# Patient Record
Sex: FEMALE | Race: WHITE | NOT HISPANIC OR LATINO | Employment: OTHER | URBAN - METROPOLITAN AREA
[De-identification: names, ages, dates, MRNs, and addresses within clinical notes are randomized per-mention and may not be internally consistent; named-entity substitution may affect disease eponyms.]

---

## 2019-12-09 ENCOUNTER — RECORDS - HEALTHEAST (OUTPATIENT)
Dept: LAB | Facility: CLINIC | Age: 79
End: 2019-12-09

## 2019-12-09 LAB
ALBUMIN SERPL-MCNC: 3.4 G/DL (ref 3.5–5)
ALP SERPL-CCNC: 81 U/L (ref 45–120)
ALT SERPL W P-5'-P-CCNC: 21 U/L (ref 0–45)
ANION GAP SERPL CALCULATED.3IONS-SCNC: 8 MMOL/L (ref 5–18)
AST SERPL W P-5'-P-CCNC: 24 U/L (ref 0–40)
BILIRUB SERPL-MCNC: 0.4 MG/DL (ref 0–1)
BUN SERPL-MCNC: 21 MG/DL (ref 8–28)
CALCIUM SERPL-MCNC: 9.5 MG/DL (ref 8.5–10.5)
CHLORIDE BLD-SCNC: 106 MMOL/L (ref 98–107)
CO2 SERPL-SCNC: 28 MMOL/L (ref 22–31)
CREAT SERPL-MCNC: 0.74 MG/DL (ref 0.6–1.1)
ERYTHROCYTE [DISTWIDTH] IN BLOOD BY AUTOMATED COUNT: 14.3 % (ref 11–14.5)
GFR SERPL CREATININE-BSD FRML MDRD: >60 ML/MIN/1.73M2
GLUCOSE BLD-MCNC: 78 MG/DL (ref 70–125)
HCT VFR BLD AUTO: 39.6 % (ref 35–47)
HGB BLD-MCNC: 12.9 G/DL (ref 12–16)
MCH RBC QN AUTO: 29.5 PG (ref 27–34)
MCHC RBC AUTO-ENTMCNC: 32.6 G/DL (ref 32–36)
MCV RBC AUTO: 91 FL (ref 80–100)
PLATELET # BLD AUTO: 352 THOU/UL (ref 140–440)
PMV BLD AUTO: 11.7 FL (ref 8.5–12.5)
POTASSIUM BLD-SCNC: 4.1 MMOL/L (ref 3.5–5)
PROT SERPL-MCNC: 7.1 G/DL (ref 6–8)
RBC # BLD AUTO: 4.37 MILL/UL (ref 3.8–5.4)
SODIUM SERPL-SCNC: 142 MMOL/L (ref 136–145)
TSH SERPL DL<=0.005 MIU/L-ACNC: 1.56 UIU/ML (ref 0.3–5)
VIT B12 SERPL-MCNC: 459 PG/ML (ref 213–816)
WBC: 8.8 THOU/UL (ref 4–11)

## 2020-01-08 ENCOUNTER — RECORDS - HEALTHEAST (OUTPATIENT)
Dept: LAB | Facility: CLINIC | Age: 80
End: 2020-01-08

## 2020-01-09 LAB — 25(OH)D3 SERPL-MCNC: 41.2 NG/ML (ref 30–80)

## 2020-08-19 ENCOUNTER — RECORDS - HEALTHEAST (OUTPATIENT)
Dept: LAB | Facility: CLINIC | Age: 80
End: 2020-08-19

## 2020-08-20 LAB
ALBUMIN SERPL-MCNC: 3.5 G/DL (ref 3.5–5)
ALP SERPL-CCNC: 80 U/L (ref 45–120)
ALT SERPL W P-5'-P-CCNC: 14 U/L (ref 0–45)
ANION GAP SERPL CALCULATED.3IONS-SCNC: 11 MMOL/L (ref 5–18)
AST SERPL W P-5'-P-CCNC: 18 U/L (ref 0–40)
BILIRUB SERPL-MCNC: 0.5 MG/DL (ref 0–1)
BUN SERPL-MCNC: 18 MG/DL (ref 8–28)
CALCIUM SERPL-MCNC: 9.9 MG/DL (ref 8.5–10.5)
CHLORIDE BLD-SCNC: 107 MMOL/L (ref 98–107)
CO2 SERPL-SCNC: 24 MMOL/L (ref 22–31)
CREAT SERPL-MCNC: 0.78 MG/DL (ref 0.6–1.1)
ERYTHROCYTE [DISTWIDTH] IN BLOOD BY AUTOMATED COUNT: 14.4 % (ref 11–14.5)
GFR SERPL CREATININE-BSD FRML MDRD: >60 ML/MIN/1.73M2
GLUCOSE BLD-MCNC: 91 MG/DL (ref 70–125)
HCT VFR BLD AUTO: 39.9 % (ref 35–47)
HGB BLD-MCNC: 12.8 G/DL (ref 12–16)
MCH RBC QN AUTO: 29 PG (ref 27–34)
MCHC RBC AUTO-ENTMCNC: 32.1 G/DL (ref 32–36)
MCV RBC AUTO: 90 FL (ref 80–100)
PLATELET # BLD AUTO: 338 THOU/UL (ref 140–440)
PMV BLD AUTO: 11.3 FL (ref 8.5–12.5)
POTASSIUM BLD-SCNC: 3.8 MMOL/L (ref 3.5–5)
PROT SERPL-MCNC: 6.9 G/DL (ref 6–8)
RBC # BLD AUTO: 4.42 MILL/UL (ref 3.8–5.4)
SODIUM SERPL-SCNC: 142 MMOL/L (ref 136–145)
WBC: 8.1 THOU/UL (ref 4–11)

## 2021-04-07 ENCOUNTER — RECORDS - HEALTHEAST (OUTPATIENT)
Dept: LAB | Facility: CLINIC | Age: 81
End: 2021-04-07

## 2021-04-08 LAB
ANION GAP SERPL CALCULATED.3IONS-SCNC: 12 MMOL/L (ref 5–18)
BUN SERPL-MCNC: 22 MG/DL (ref 8–28)
CALCIUM SERPL-MCNC: 9.6 MG/DL (ref 8.5–10.5)
CHLORIDE BLD-SCNC: 106 MMOL/L (ref 98–107)
CO2 SERPL-SCNC: 24 MMOL/L (ref 22–31)
CREAT SERPL-MCNC: 0.83 MG/DL (ref 0.6–1.1)
ERYTHROCYTE [DISTWIDTH] IN BLOOD BY AUTOMATED COUNT: 14.3 % (ref 11–14.5)
GFR SERPL CREATININE-BSD FRML MDRD: >60 ML/MIN/1.73M2
GLUCOSE BLD-MCNC: 116 MG/DL (ref 70–125)
HCT VFR BLD AUTO: 40.3 % (ref 35–47)
HGB BLD-MCNC: 13 G/DL (ref 12–16)
MCH RBC QN AUTO: 30.2 PG (ref 27–34)
MCHC RBC AUTO-ENTMCNC: 32.3 G/DL (ref 32–36)
MCV RBC AUTO: 94 FL (ref 80–100)
PLATELET # BLD AUTO: 378 THOU/UL (ref 140–440)
PMV BLD AUTO: 11.1 FL (ref 8.5–12.5)
POTASSIUM BLD-SCNC: 4.2 MMOL/L (ref 3.5–5)
RBC # BLD AUTO: 4.31 MILL/UL (ref 3.8–5.4)
SODIUM SERPL-SCNC: 142 MMOL/L (ref 136–145)
WBC: 10.9 THOU/UL (ref 4–11)

## 2021-04-09 LAB — 25(OH)D3 SERPL-MCNC: 39.1 NG/ML (ref 30–80)

## 2022-01-01 ENCOUNTER — LAB REQUISITION (OUTPATIENT)
Dept: LAB | Facility: CLINIC | Age: 82
End: 2022-01-01
Payer: COMMERCIAL

## 2022-01-01 DIAGNOSIS — R05.9 COUGH, UNSPECIFIED: ICD-10-CM

## 2022-01-01 LAB
FLUAV AG SPEC QL IA: POSITIVE
FLUBV AG SPEC QL IA: NEGATIVE

## 2022-01-01 PROCEDURE — 87804 INFLUENZA ASSAY W/OPTIC: CPT | Mod: ORL

## 2022-03-02 ENCOUNTER — LAB REQUISITION (OUTPATIENT)
Dept: LAB | Facility: CLINIC | Age: 82
End: 2022-03-02
Payer: COMMERCIAL

## 2022-03-02 DIAGNOSIS — I10 ESSENTIAL (PRIMARY) HYPERTENSION: ICD-10-CM

## 2022-03-02 DIAGNOSIS — M81.0 AGE-RELATED OSTEOPOROSIS WITHOUT CURRENT PATHOLOGICAL FRACTURE: ICD-10-CM

## 2022-03-03 LAB
ALBUMIN SERPL-MCNC: 3.6 G/DL (ref 3.5–5)
ALP SERPL-CCNC: 77 U/L (ref 45–120)
ALT SERPL W P-5'-P-CCNC: 15 U/L (ref 0–45)
ANION GAP SERPL CALCULATED.3IONS-SCNC: 8 MMOL/L (ref 5–18)
AST SERPL W P-5'-P-CCNC: 22 U/L (ref 0–40)
BILIRUB SERPL-MCNC: 0.5 MG/DL (ref 0–1)
BUN SERPL-MCNC: 17 MG/DL (ref 8–28)
CALCIUM SERPL-MCNC: 9.6 MG/DL (ref 8.5–10.5)
CHLORIDE BLD-SCNC: 103 MMOL/L (ref 98–107)
CO2 SERPL-SCNC: 28 MMOL/L (ref 22–31)
CREAT SERPL-MCNC: 0.79 MG/DL (ref 0.6–1.1)
ERYTHROCYTE [DISTWIDTH] IN BLOOD BY AUTOMATED COUNT: 14.4 % (ref 10–15)
GFR SERPL CREATININE-BSD FRML MDRD: 74 ML/MIN/1.73M2
GLUCOSE BLD-MCNC: 72 MG/DL (ref 70–125)
HCT VFR BLD AUTO: 39 % (ref 35–47)
HGB BLD-MCNC: 12.7 G/DL (ref 11.7–15.7)
MCH RBC QN AUTO: 30.7 PG (ref 26.5–33)
MCHC RBC AUTO-ENTMCNC: 32.6 G/DL (ref 31.5–36.5)
MCV RBC AUTO: 94 FL (ref 78–100)
PLATELET # BLD AUTO: 343 10E3/UL (ref 150–450)
POTASSIUM BLD-SCNC: 4.5 MMOL/L (ref 3.5–5)
PROT SERPL-MCNC: 6.6 G/DL (ref 6–8)
RBC # BLD AUTO: 4.14 10E6/UL (ref 3.8–5.2)
SODIUM SERPL-SCNC: 139 MMOL/L (ref 136–145)
WBC # BLD AUTO: 6.8 10E3/UL (ref 4–11)

## 2022-03-03 PROCEDURE — 82306 VITAMIN D 25 HYDROXY: CPT | Mod: ORL | Performed by: FAMILY MEDICINE

## 2022-03-03 PROCEDURE — 80053 COMPREHEN METABOLIC PANEL: CPT | Mod: ORL | Performed by: FAMILY MEDICINE

## 2022-03-03 PROCEDURE — P9603 ONE-WAY ALLOW PRORATED MILES: HCPCS | Mod: ORL | Performed by: FAMILY MEDICINE

## 2022-03-03 PROCEDURE — 85027 COMPLETE CBC AUTOMATED: CPT | Mod: ORL | Performed by: FAMILY MEDICINE

## 2022-03-03 PROCEDURE — 36415 COLL VENOUS BLD VENIPUNCTURE: CPT | Mod: ORL | Performed by: FAMILY MEDICINE

## 2022-03-04 LAB — DEPRECATED CALCIDIOL+CALCIFEROL SERPL-MC: 33 UG/L (ref 30–80)

## 2022-08-11 ENCOUNTER — LAB REQUISITION (OUTPATIENT)
Dept: LAB | Facility: CLINIC | Age: 82
End: 2022-08-11
Payer: COMMERCIAL

## 2022-08-11 DIAGNOSIS — G12.20 MOTOR NEURON DISEASE, UNSPECIFIED (H): ICD-10-CM

## 2022-08-16 LAB
ALBUMIN SERPL BCG-MCNC: 3.9 G/DL (ref 3.5–5.2)
ALP SERPL-CCNC: 66 U/L (ref 35–104)
ALT SERPL W P-5'-P-CCNC: 18 U/L (ref 10–35)
ANION GAP SERPL CALCULATED.3IONS-SCNC: 20 MMOL/L (ref 7–15)
AST SERPL W P-5'-P-CCNC: 47 U/L (ref 10–35)
BILIRUB SERPL-MCNC: 0.5 MG/DL
BUN SERPL-MCNC: 19.9 MG/DL (ref 8–23)
CALCIUM SERPL-MCNC: 10.1 MG/DL (ref 8.8–10.2)
CHLORIDE SERPL-SCNC: 105 MMOL/L (ref 98–107)
CREAT SERPL-MCNC: 0.81 MG/DL (ref 0.51–0.95)
DEPRECATED HCO3 PLAS-SCNC: 19 MMOL/L (ref 22–29)
ERYTHROCYTE [DISTWIDTH] IN BLOOD BY AUTOMATED COUNT: 15 % (ref 10–15)
GFR SERPL CREATININE-BSD FRML MDRD: 72 ML/MIN/1.73M2
GLUCOSE SERPL-MCNC: 65 MG/DL (ref 70–99)
HCT VFR BLD AUTO: 39.7 % (ref 35–47)
HGB BLD-MCNC: 12.9 G/DL (ref 11.7–15.7)
MCH RBC QN AUTO: 30.8 PG (ref 26.5–33)
MCHC RBC AUTO-ENTMCNC: 32.5 G/DL (ref 31.5–36.5)
MCV RBC AUTO: 95 FL (ref 78–100)
PLATELET # BLD AUTO: 350 10E3/UL (ref 150–450)
POTASSIUM SERPL-SCNC: 5 MMOL/L (ref 3.4–5.3)
PROT SERPL-MCNC: 6.9 G/DL (ref 6.4–8.3)
RBC # BLD AUTO: 4.19 10E6/UL (ref 3.8–5.2)
SODIUM SERPL-SCNC: 144 MMOL/L (ref 136–145)
WBC # BLD AUTO: 6.3 10E3/UL (ref 4–11)

## 2022-08-16 PROCEDURE — 80053 COMPREHEN METABOLIC PANEL: CPT | Mod: ORL | Performed by: NURSE PRACTITIONER

## 2022-08-16 PROCEDURE — 85027 COMPLETE CBC AUTOMATED: CPT | Mod: ORL | Performed by: NURSE PRACTITIONER

## 2022-08-16 PROCEDURE — 36415 COLL VENOUS BLD VENIPUNCTURE: CPT | Mod: ORL | Performed by: NURSE PRACTITIONER

## 2022-08-16 PROCEDURE — P9604 ONE-WAY ALLOW PRORATED TRIP: HCPCS | Mod: ORL | Performed by: NURSE PRACTITIONER

## 2022-10-20 ENCOUNTER — LAB REQUISITION (OUTPATIENT)
Dept: LAB | Facility: CLINIC | Age: 82
End: 2022-10-20
Payer: COMMERCIAL

## 2022-10-20 DIAGNOSIS — R53.83 OTHER FATIGUE: ICD-10-CM

## 2022-10-21 ENCOUNTER — LAB REQUISITION (OUTPATIENT)
Dept: LAB | Facility: CLINIC | Age: 82
End: 2022-10-21
Payer: COMMERCIAL

## 2022-10-21 DIAGNOSIS — R53.83 OTHER FATIGUE: ICD-10-CM

## 2022-10-21 LAB
ALBUMIN UR-MCNC: 20 MG/DL
APPEARANCE UR: CLEAR
BILIRUB UR QL STRIP: NEGATIVE
COLOR UR AUTO: YELLOW
GLUCOSE UR STRIP-MCNC: NEGATIVE MG/DL
HGB UR QL STRIP: NEGATIVE
HYALINE CASTS: 1 /LPF
KETONES UR STRIP-MCNC: NEGATIVE MG/DL
LEUKOCYTE ESTERASE UR QL STRIP: NEGATIVE
MUCOUS THREADS #/AREA URNS LPF: PRESENT /LPF
NITRATE UR QL: NEGATIVE
PH UR STRIP: 6 [PH] (ref 5–7)
RBC URINE: 1 /HPF
SP GR UR STRIP: 1.03 (ref 1–1.03)
SQUAMOUS EPITHELIAL: 1 /HPF
TRANSITIONAL EPI: <1 /HPF
UROBILINOGEN UR STRIP-MCNC: NORMAL MG/DL
WBC URINE: 4 /HPF

## 2022-10-21 PROCEDURE — 81001 URINALYSIS AUTO W/SCOPE: CPT | Mod: ORL | Performed by: NURSE PRACTITIONER

## 2022-10-21 PROCEDURE — 87086 URINE CULTURE/COLONY COUNT: CPT | Mod: ORL | Performed by: NURSE PRACTITIONER

## 2022-10-22 LAB — BACTERIA UR CULT: NORMAL

## 2022-10-25 LAB
ANION GAP SERPL CALCULATED.3IONS-SCNC: 11 MMOL/L (ref 7–15)
BUN SERPL-MCNC: 22.7 MG/DL (ref 8–23)
CALCIUM SERPL-MCNC: 9.6 MG/DL (ref 8.8–10.2)
CHLORIDE SERPL-SCNC: 107 MMOL/L (ref 98–107)
CREAT SERPL-MCNC: 0.85 MG/DL (ref 0.51–0.95)
DEPRECATED HCO3 PLAS-SCNC: 25 MMOL/L (ref 22–29)
ERYTHROCYTE [DISTWIDTH] IN BLOOD BY AUTOMATED COUNT: 16.7 % (ref 10–15)
GFR SERPL CREATININE-BSD FRML MDRD: 68 ML/MIN/1.73M2
GLUCOSE SERPL-MCNC: 104 MG/DL (ref 70–99)
HCT VFR BLD AUTO: 37.6 % (ref 35–47)
HGB BLD-MCNC: 12.1 G/DL (ref 11.7–15.7)
MCH RBC QN AUTO: 30.9 PG (ref 26.5–33)
MCHC RBC AUTO-ENTMCNC: 32.2 G/DL (ref 31.5–36.5)
MCV RBC AUTO: 96 FL (ref 78–100)
PLATELET # BLD AUTO: 378 10E3/UL (ref 150–450)
POTASSIUM SERPL-SCNC: 4.2 MMOL/L (ref 3.4–5.3)
RBC # BLD AUTO: 3.91 10E6/UL (ref 3.8–5.2)
SODIUM SERPL-SCNC: 143 MMOL/L (ref 136–145)
WBC # BLD AUTO: 9 10E3/UL (ref 4–11)

## 2022-10-25 PROCEDURE — 85027 COMPLETE CBC AUTOMATED: CPT | Mod: ORL | Performed by: NURSE PRACTITIONER

## 2022-10-25 PROCEDURE — 36415 COLL VENOUS BLD VENIPUNCTURE: CPT | Mod: ORL | Performed by: NURSE PRACTITIONER

## 2022-10-25 PROCEDURE — P9603 ONE-WAY ALLOW PRORATED MILES: HCPCS | Mod: ORL | Performed by: NURSE PRACTITIONER

## 2022-10-25 PROCEDURE — 80048 BASIC METABOLIC PNL TOTAL CA: CPT | Mod: ORL | Performed by: NURSE PRACTITIONER

## 2023-01-01 ENCOUNTER — LAB REQUISITION (OUTPATIENT)
Dept: LAB | Facility: CLINIC | Age: 83
End: 2023-01-01
Payer: COMMERCIAL

## 2023-01-01 ENCOUNTER — APPOINTMENT (OUTPATIENT)
Dept: CT IMAGING | Facility: CLINIC | Age: 83
End: 2023-01-01
Attending: EMERGENCY MEDICINE
Payer: COMMERCIAL

## 2023-01-01 ENCOUNTER — APPOINTMENT (OUTPATIENT)
Dept: CT IMAGING | Facility: CLINIC | Age: 83
End: 2023-01-01
Attending: INTERNAL MEDICINE
Payer: COMMERCIAL

## 2023-01-01 ENCOUNTER — TELEPHONE (OUTPATIENT)
Dept: NEUROSURGERY | Facility: CLINIC | Age: 83
End: 2023-01-01
Payer: COMMERCIAL

## 2023-01-01 ENCOUNTER — HOSPITAL ENCOUNTER (OUTPATIENT)
Facility: CLINIC | Age: 83
Setting detail: OBSERVATION
Discharge: NURSING, REHAB OR LONG-TERM CARE | End: 2023-05-02
Attending: EMERGENCY MEDICINE | Admitting: HOSPITALIST
Payer: COMMERCIAL

## 2023-01-01 ENCOUNTER — PATIENT OUTREACH (OUTPATIENT)
Dept: CARE COORDINATION | Facility: CLINIC | Age: 83
End: 2023-01-01
Payer: COMMERCIAL

## 2023-01-01 ENCOUNTER — APPOINTMENT (OUTPATIENT)
Dept: GENERAL RADIOLOGY | Facility: CLINIC | Age: 83
End: 2023-01-01
Attending: EMERGENCY MEDICINE
Payer: COMMERCIAL

## 2023-01-01 ENCOUNTER — HOSPITAL ENCOUNTER (EMERGENCY)
Facility: CLINIC | Age: 83
Discharge: HOME | End: 2023-04-13
Attending: EMERGENCY MEDICINE | Admitting: EMERGENCY MEDICINE
Payer: COMMERCIAL

## 2023-01-01 VITALS
SYSTOLIC BLOOD PRESSURE: 142 MMHG | WEIGHT: 145 LBS | OXYGEN SATURATION: 95 % | RESPIRATION RATE: 20 BRPM | DIASTOLIC BLOOD PRESSURE: 78 MMHG | TEMPERATURE: 97.1 F | HEART RATE: 78 BPM

## 2023-01-01 VITALS
OXYGEN SATURATION: 92 % | DIASTOLIC BLOOD PRESSURE: 98 MMHG | SYSTOLIC BLOOD PRESSURE: 134 MMHG | HEART RATE: 74 BPM | RESPIRATION RATE: 18 BRPM | TEMPERATURE: 97 F

## 2023-01-01 DIAGNOSIS — E04.1 NONTOXIC SINGLE THYROID NODULE: ICD-10-CM

## 2023-01-01 DIAGNOSIS — M81.0 AGE-RELATED OSTEOPOROSIS WITHOUT CURRENT PATHOLOGICAL FRACTURE: ICD-10-CM

## 2023-01-01 DIAGNOSIS — R40.4 SPELL OF ALTERED CONSCIOUSNESS: ICD-10-CM

## 2023-01-01 DIAGNOSIS — I60.9 NONTRAUMATIC SUBARACHNOID HEMORRHAGE, UNSPECIFIED (H): ICD-10-CM
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
## 2023-01-01 DIAGNOSIS — S09.90XA INJURY OF HEAD, INITIAL ENCOUNTER: ICD-10-CM

## 2023-01-01 DIAGNOSIS — I60.9 SUBARACHNOID HEMORRHAGE (H): ICD-10-CM

## 2023-01-01 DIAGNOSIS — E55.9 VITAMIN D DEFICIENCY, UNSPECIFIED: ICD-10-CM

## 2023-01-01 DIAGNOSIS — G31.09 OTHER FRONTOTEMPORAL NEUROCOGNITIVE DISORDER (CODE) (H): ICD-10-CM

## 2023-01-01 LAB
ALBUMIN SERPL BCG-MCNC: 3.7 G/DL (ref 3.5–5.2)
ALBUMIN SERPL BCG-MCNC: 4 G/DL (ref 3.5–5.2)
ALBUMIN UR-MCNC: NEGATIVE MG/DL
ALP SERPL-CCNC: 66 U/L (ref 35–104)
ALP SERPL-CCNC: 73 U/L (ref 35–104)
ALT SERPL W P-5'-P-CCNC: 16 U/L (ref 10–35)
ALT SERPL W P-5'-P-CCNC: 17 U/L (ref 10–35)
ANION GAP SERPL CALCULATED.3IONS-SCNC: 11 MMOL/L (ref 7–15)
ANION GAP SERPL CALCULATED.3IONS-SCNC: 11 MMOL/L (ref 7–15)
APPEARANCE UR: CLEAR
AST SERPL W P-5'-P-CCNC: 18 U/L (ref 10–35)
AST SERPL W P-5'-P-CCNC: 24 U/L (ref 10–35)
ATRIAL RATE - MUSE: 77 BPM
BACTERIA UR CULT: NORMAL
BASOPHILS # BLD AUTO: 0.1 10E3/UL (ref 0–0.2)
BASOPHILS NFR BLD AUTO: 1 %
BILIRUB SERPL-MCNC: 0.4 MG/DL
BILIRUB SERPL-MCNC: 0.6 MG/DL
BILIRUB UR QL STRIP: NEGATIVE
BUN SERPL-MCNC: 16.9 MG/DL (ref 8–23)
BUN SERPL-MCNC: 23.2 MG/DL (ref 8–23)
CALCIUM SERPL-MCNC: 9.5 MG/DL (ref 8.8–10.2)
CALCIUM SERPL-MCNC: 9.7 MG/DL (ref 8.8–10.2)
CHLORIDE SERPL-SCNC: 105 MMOL/L (ref 98–107)
CHLORIDE SERPL-SCNC: 105 MMOL/L (ref 98–107)
COLOR UR AUTO: ABNORMAL
CREAT SERPL-MCNC: 0.9 MG/DL (ref 0.51–0.95)
CREAT SERPL-MCNC: 0.92 MG/DL (ref 0.51–0.95)
DEPRECATED CALCIDIOL+CALCIFEROL SERPL-MC: 45 UG/L (ref 20–75)
DEPRECATED HCO3 PLAS-SCNC: 24 MMOL/L (ref 22–29)
DEPRECATED HCO3 PLAS-SCNC: 25 MMOL/L (ref 22–29)
DIASTOLIC BLOOD PRESSURE - MUSE: NORMAL MMHG
EOSINOPHIL # BLD AUTO: 0.2 10E3/UL (ref 0–0.7)
EOSINOPHIL # BLD AUTO: 0.4 10E3/UL (ref 0–0.7)
EOSINOPHIL # BLD AUTO: 0.5 10E3/UL (ref 0–0.7)
EOSINOPHIL NFR BLD AUTO: 3 %
EOSINOPHIL NFR BLD AUTO: 4 %
EOSINOPHIL NFR BLD AUTO: 6 %
ERYTHROCYTE [DISTWIDTH] IN BLOOD BY AUTOMATED COUNT: 14.2 % (ref 10–15)
ERYTHROCYTE [DISTWIDTH] IN BLOOD BY AUTOMATED COUNT: 14.4 % (ref 10–15)
ERYTHROCYTE [DISTWIDTH] IN BLOOD BY AUTOMATED COUNT: 14.4 % (ref 10–15)
GFR SERPL CREATININE-BSD FRML MDRD: 61 ML/MIN/1.73M2
GFR SERPL CREATININE-BSD FRML MDRD: 63 ML/MIN/1.73M2
GLUCOSE SERPL-MCNC: 104 MG/DL (ref 70–99)
GLUCOSE SERPL-MCNC: 91 MG/DL (ref 70–99)
GLUCOSE UR STRIP-MCNC: NEGATIVE MG/DL
HCT VFR BLD AUTO: 35.7 % (ref 35–47)
HCT VFR BLD AUTO: 38.4 % (ref 35–47)
HCT VFR BLD AUTO: 39 % (ref 35–47)
HGB BLD-MCNC: 11.7 G/DL (ref 11.7–15.7)
HGB BLD-MCNC: 11.7 G/DL (ref 11.7–15.7)
HGB BLD-MCNC: 12.5 G/DL (ref 11.7–15.7)
HGB UR QL STRIP: NEGATIVE
HOLD SPECIMEN: NORMAL
HOLD SPECIMEN: NORMAL
HYALINE CASTS: 1 /LPF
IMM GRANULOCYTES # BLD: 0 10E3/UL
IMM GRANULOCYTES NFR BLD: 0 %
IMM GRANULOCYTES NFR BLD: 0 %
IMM GRANULOCYTES NFR BLD: 1 %
INR PPP: 1.05 (ref 0.85–1.15)
INTERPRETATION ECG - MUSE: NORMAL
KETONES UR STRIP-MCNC: 20 MG/DL
LEUKOCYTE ESTERASE UR QL STRIP: ABNORMAL
LYMPHOCYTES # BLD AUTO: 1.7 10E3/UL (ref 0.8–5.3)
LYMPHOCYTES # BLD AUTO: 1.8 10E3/UL (ref 0.8–5.3)
LYMPHOCYTES # BLD AUTO: 1.9 10E3/UL (ref 0.8–5.3)
LYMPHOCYTES NFR BLD AUTO: 17 %
LYMPHOCYTES NFR BLD AUTO: 21 %
LYMPHOCYTES NFR BLD AUTO: 28 %
MCH RBC QN AUTO: 29.4 PG (ref 26.5–33)
MCH RBC QN AUTO: 30 PG (ref 26.5–33)
MCH RBC QN AUTO: 30.5 PG (ref 26.5–33)
MCHC RBC AUTO-ENTMCNC: 30.5 G/DL (ref 31.5–36.5)
MCHC RBC AUTO-ENTMCNC: 32.1 G/DL (ref 31.5–36.5)
MCHC RBC AUTO-ENTMCNC: 32.8 G/DL (ref 31.5–36.5)
MCV RBC AUTO: 93 FL (ref 78–100)
MCV RBC AUTO: 94 FL (ref 78–100)
MCV RBC AUTO: 97 FL (ref 78–100)
MONOCYTES # BLD AUTO: 0.6 10E3/UL (ref 0–1.3)
MONOCYTES # BLD AUTO: 1.1 10E3/UL (ref 0–1.3)
MONOCYTES # BLD AUTO: 1.1 10E3/UL (ref 0–1.3)
MONOCYTES NFR BLD AUTO: 10 %
MONOCYTES NFR BLD AUTO: 13 %
MONOCYTES NFR BLD AUTO: 9 %
MUCOUS THREADS #/AREA URNS LPF: PRESENT /LPF
NEUTROPHILS # BLD AUTO: 3.7 10E3/UL (ref 1.6–8.3)
NEUTROPHILS # BLD AUTO: 5 10E3/UL (ref 1.6–8.3)
NEUTROPHILS # BLD AUTO: 7.6 10E3/UL (ref 1.6–8.3)
NEUTROPHILS NFR BLD AUTO: 58 %
NEUTROPHILS NFR BLD AUTO: 59 %
NEUTROPHILS NFR BLD AUTO: 68 %
NITRATE UR QL: NEGATIVE
NRBC # BLD AUTO: 0 10E3/UL
NRBC BLD AUTO-RTO: 0 /100
P AXIS - MUSE: 67 DEGREES
PH UR STRIP: 5.5 [PH] (ref 5–7)
PLATELET # BLD AUTO: 303 10E3/UL (ref 150–450)
PLATELET # BLD AUTO: 331 10E3/UL (ref 150–450)
PLATELET # BLD AUTO: 366 10E3/UL (ref 150–450)
POTASSIUM SERPL-SCNC: 4.2 MMOL/L (ref 3.4–5.3)
POTASSIUM SERPL-SCNC: 4.2 MMOL/L (ref 3.4–5.3)
PR INTERVAL - MUSE: 130 MS
PROT SERPL-MCNC: 6.3 G/DL (ref 6.4–8.3)
PROT SERPL-MCNC: 7.1 G/DL (ref 6.4–8.3)
QRS DURATION - MUSE: 68 MS
QT - MUSE: 392 MS
QTC - MUSE: 443 MS
R AXIS - MUSE: -12 DEGREES
RBC # BLD AUTO: 3.84 10E6/UL (ref 3.8–5.2)
RBC # BLD AUTO: 3.98 10E6/UL (ref 3.8–5.2)
RBC # BLD AUTO: 4.17 10E6/UL (ref 3.8–5.2)
RBC URINE: 1 /HPF
SODIUM SERPL-SCNC: 140 MMOL/L (ref 136–145)
SODIUM SERPL-SCNC: 141 MMOL/L (ref 136–145)
SP GR UR STRIP: 1.02 (ref 1–1.03)
SQUAMOUS EPITHELIAL: 1 /HPF
SYSTOLIC BLOOD PRESSURE - MUSE: NORMAL MMHG
T AXIS - MUSE: 51 DEGREES
T4 FREE SERPL-MCNC: 0.98 NG/DL (ref 0.9–1.7)
TSH SERPL DL<=0.005 MIU/L-ACNC: 1.68 UIU/ML (ref 0.3–4.2)
UROBILINOGEN UR STRIP-MCNC: NORMAL MG/DL
VENTRICULAR RATE- MUSE: 77 BPM
WBC # BLD AUTO: 11.1 10E3/UL (ref 4–11)
WBC # BLD AUTO: 6.3 10E3/UL (ref 4–11)
WBC # BLD AUTO: 8.4 10E3/UL (ref 4–11)
WBC URINE: 14 /HPF

## 2023-01-01 PROCEDURE — 80053 COMPREHEN METABOLIC PANEL: CPT | Performed by: EMERGENCY MEDICINE

## 2023-01-01 PROCEDURE — 250N000011 HC RX IP 250 OP 636: Performed by: EMERGENCY MEDICINE

## 2023-01-01 PROCEDURE — 36415 COLL VENOUS BLD VENIPUNCTURE: CPT | Performed by: EMERGENCY MEDICINE

## 2023-01-01 PROCEDURE — P9603 ONE-WAY ALLOW PRORATED MILES: HCPCS | Mod: ORL | Performed by: NURSE PRACTITIONER

## 2023-01-01 PROCEDURE — 87086 URINE CULTURE/COLONY COUNT: CPT | Performed by: EMERGENCY MEDICINE

## 2023-01-01 PROCEDURE — 99222 1ST HOSP IP/OBS MODERATE 55: CPT | Performed by: PHYSICIAN ASSISTANT

## 2023-01-01 PROCEDURE — 82306 VITAMIN D 25 HYDROXY: CPT | Mod: ORL | Performed by: NURSE PRACTITIONER

## 2023-01-01 PROCEDURE — 85025 COMPLETE CBC W/AUTO DIFF WBC: CPT | Performed by: EMERGENCY MEDICINE

## 2023-01-01 PROCEDURE — 81001 URINALYSIS AUTO W/SCOPE: CPT | Performed by: EMERGENCY MEDICINE

## 2023-01-01 PROCEDURE — 258N000003 HC RX IP 258 OP 636: Performed by: INTERNAL MEDICINE

## 2023-01-01 PROCEDURE — G0378 HOSPITAL OBSERVATION PER HR: HCPCS

## 2023-01-01 PROCEDURE — 70450 CT HEAD/BRAIN W/O DYE: CPT

## 2023-01-01 PROCEDURE — 99236 HOSP IP/OBS SAME DATE HI 85: CPT | Performed by: INTERNAL MEDICINE

## 2023-01-01 PROCEDURE — 99207 PR NO BILLABLE SERVICE THIS VISIT: CPT | Performed by: HOSPITALIST

## 2023-01-01 PROCEDURE — 72170 X-RAY EXAM OF PELVIS: CPT

## 2023-01-01 PROCEDURE — 85014 HEMATOCRIT: CPT | Performed by: EMERGENCY MEDICINE

## 2023-01-01 PROCEDURE — 84439 ASSAY OF FREE THYROXINE: CPT | Mod: ORL | Performed by: NURSE PRACTITIONER

## 2023-01-01 PROCEDURE — 85610 PROTHROMBIN TIME: CPT | Performed by: EMERGENCY MEDICINE

## 2023-01-01 PROCEDURE — 90714 TD VACC NO PRESV 7 YRS+ IM: CPT | Performed by: EMERGENCY MEDICINE

## 2023-01-01 PROCEDURE — P9604 ONE-WAY ALLOW PRORATED TRIP: HCPCS | Mod: ORL | Performed by: NURSE PRACTITIONER

## 2023-01-01 PROCEDURE — 93005 ELECTROCARDIOGRAM TRACING: CPT

## 2023-01-01 PROCEDURE — 90471 IMMUNIZATION ADMIN: CPT | Performed by: EMERGENCY MEDICINE

## 2023-01-01 PROCEDURE — 70486 CT MAXILLOFACIAL W/O DYE: CPT

## 2023-01-01 PROCEDURE — 99285 EMERGENCY DEPT VISIT HI MDM: CPT | Mod: 25

## 2023-01-01 PROCEDURE — 36415 COLL VENOUS BLD VENIPUNCTURE: CPT | Mod: ORL | Performed by: NURSE PRACTITIONER

## 2023-01-01 PROCEDURE — 85025 COMPLETE CBC W/AUTO DIFF WBC: CPT | Mod: ORL | Performed by: NURSE PRACTITIONER

## 2023-01-01 PROCEDURE — 84443 ASSAY THYROID STIM HORMONE: CPT | Mod: ORL | Performed by: NURSE PRACTITIONER

## 2023-01-01 PROCEDURE — 99283 EMERGENCY DEPT VISIT LOW MDM: CPT

## 2023-01-01 PROCEDURE — 72125 CT NECK SPINE W/O DYE: CPT

## 2023-01-01 RX ORDER — BISACODYL 10 MG
10 SUPPOSITORY, RECTAL RECTAL DAILY PRN
Status: DISCONTINUED | OUTPATIENT
Start: 2023-01-01 | End: 2023-01-01 | Stop reason: HOSPADM

## 2023-01-01 RX ORDER — LIDOCAINE 40 MG/G
CREAM TOPICAL
Status: DISCONTINUED | OUTPATIENT
Start: 2023-01-01 | End: 2023-01-01 | Stop reason: HOSPADM

## 2023-01-01 RX ORDER — LABETALOL HYDROCHLORIDE 5 MG/ML
10 INJECTION, SOLUTION INTRAVENOUS
Status: DISCONTINUED | OUTPATIENT
Start: 2023-01-01 | End: 2023-01-01 | Stop reason: HOSPADM

## 2023-01-01 RX ORDER — VITAMIN B COMPLEX
1 TABLET ORAL DAILY
COMMUNITY

## 2023-01-01 RX ORDER — POLYETHYLENE GLYCOL 3350 17 G/17G
17 POWDER, FOR SOLUTION ORAL DAILY PRN
Status: DISCONTINUED | OUTPATIENT
Start: 2023-01-01 | End: 2023-01-01 | Stop reason: HOSPADM

## 2023-01-01 RX ORDER — NALOXONE HYDROCHLORIDE 0.4 MG/ML
0.2 INJECTION, SOLUTION INTRAMUSCULAR; INTRAVENOUS; SUBCUTANEOUS
Status: DISCONTINUED | OUTPATIENT
Start: 2023-01-01 | End: 2023-01-01 | Stop reason: HOSPADM

## 2023-01-01 RX ORDER — ONDANSETRON 2 MG/ML
4 INJECTION INTRAMUSCULAR; INTRAVENOUS EVERY 6 HOURS PRN
Status: DISCONTINUED | OUTPATIENT
Start: 2023-01-01 | End: 2023-01-01 | Stop reason: HOSPADM

## 2023-01-01 RX ORDER — ACETAMINOPHEN 500 MG
500-1000 TABLET ORAL EVERY 6 HOURS PRN
COMMUNITY

## 2023-01-01 RX ORDER — CALCIPOTRIENE 50 UG/G
CREAM TOPICAL 2 TIMES DAILY
COMMUNITY

## 2023-01-01 RX ORDER — AMOXICILLIN 250 MG
2 CAPSULE ORAL 2 TIMES DAILY PRN
Status: DISCONTINUED | OUTPATIENT
Start: 2023-01-01 | End: 2023-01-01 | Stop reason: HOSPADM

## 2023-01-01 RX ORDER — AMOXICILLIN 250 MG
1 CAPSULE ORAL 2 TIMES DAILY PRN
Status: DISCONTINUED | OUTPATIENT
Start: 2023-01-01 | End: 2023-01-01 | Stop reason: HOSPADM

## 2023-01-01 RX ORDER — ACETAMINOPHEN 650 MG/1
650 SUPPOSITORY RECTAL EVERY 6 HOURS PRN
Status: DISCONTINUED | OUTPATIENT
Start: 2023-01-01 | End: 2023-01-01 | Stop reason: HOSPADM

## 2023-01-01 RX ORDER — MULTIVITAMIN,THERAPEUTIC
1 TABLET ORAL DAILY
COMMUNITY

## 2023-01-01 RX ORDER — ONDANSETRON 4 MG/1
4 TABLET, ORALLY DISINTEGRATING ORAL EVERY 6 HOURS PRN
Status: DISCONTINUED | OUTPATIENT
Start: 2023-01-01 | End: 2023-01-01 | Stop reason: HOSPADM

## 2023-01-01 RX ORDER — NALOXONE HYDROCHLORIDE 0.4 MG/ML
0.4 INJECTION, SOLUTION INTRAMUSCULAR; INTRAVENOUS; SUBCUTANEOUS
Status: DISCONTINUED | OUTPATIENT
Start: 2023-01-01 | End: 2023-01-01 | Stop reason: HOSPADM

## 2023-01-01 RX ORDER — HYDRALAZINE HYDROCHLORIDE 20 MG/ML
10 INJECTION INTRAMUSCULAR; INTRAVENOUS EVERY 4 HOURS PRN
Status: DISCONTINUED | OUTPATIENT
Start: 2023-01-01 | End: 2023-01-01 | Stop reason: HOSPADM

## 2023-01-01 RX ORDER — LABETALOL HYDROCHLORIDE 5 MG/ML
10 INJECTION, SOLUTION INTRAVENOUS ONCE
Status: DISCONTINUED | OUTPATIENT
Start: 2023-01-01 | End: 2023-01-01

## 2023-01-01 RX ORDER — OLANZAPINE 5 MG/1
5 TABLET, ORALLY DISINTEGRATING ORAL
Status: DISCONTINUED | OUTPATIENT
Start: 2023-01-01 | End: 2023-01-01 | Stop reason: HOSPADM

## 2023-01-01 RX ORDER — ACETAMINOPHEN 325 MG/1
650 TABLET ORAL EVERY 6 HOURS PRN
Status: DISCONTINUED | OUTPATIENT
Start: 2023-01-01 | End: 2023-01-01 | Stop reason: HOSPADM

## 2023-01-01 RX ORDER — BUSPIRONE HYDROCHLORIDE 7.5 MG/1
7.5 TABLET ORAL 2 TIMES DAILY
COMMUNITY

## 2023-01-01 RX ORDER — MULTIVIT-MIN/IRON/FOLIC ACID/K 18-600-40
500 CAPSULE ORAL DAILY
COMMUNITY

## 2023-01-01 RX ORDER — LEFLUNOMIDE 10 MG/1
10 TABLET ORAL DAILY
COMMUNITY

## 2023-01-01 RX ADMIN — CLOSTRIDIUM TETANI TOXOID ANTIGEN (FORMALDEHYDE INACTIVATED) AND CORYNEBACTERIUM DIPHTHERIAE TOXOID ANTIGEN (FORMALDEHYDE INACTIVATED) 0.5 ML: 5; 2 INJECTION, SUSPENSION INTRAMUSCULAR at 01:06

## 2023-01-01 RX ADMIN — SODIUM CHLORIDE 1000 ML: 9 INJECTION, SOLUTION INTRAVENOUS at 09:03

## 2023-01-01 ASSESSMENT — ACTIVITIES OF DAILY LIVING (ADL)
ADLS_ACUITY_SCORE: 35
ADLS_ACUITY_SCORE: 37
ADLS_ACUITY_SCORE: 35
ADLS_ACUITY_SCORE: 45
ADLS_ACUITY_SCORE: 37

## 2023-04-13 NOTE — ED TRIAGE NOTES
Patient comes in from a dementia memory care facility where she smiles and is nonverbal at baseline. She walks with an assist of 2. Today she was getting showered where she has a 10-30 second period of time where her face contorted and staff called EMS. Patient moving all limbs for EMS, acting at baseline.      Triage Assessment     Row Name 04/13/23 0702       Triage Assessment (Adult)    Airway WDL WDL       Respiratory WDL    Respiratory WDL WDL       Skin Circulation/Temperature WDL    Skin Circulation/Temperature WDL WDL       Cardiac WDL    Cardiac WDL WDL       Peripheral/Neurovascular WDL    Peripheral Neurovascular WDL WDL       Cognitive/Neuro/Behavioral WDL    Cognitive/Neuro/Behavioral WDL WDL

## 2023-04-13 NOTE — ED PROVIDER NOTES
History     Chief Complaint:  Altered Mental Status       The history is provided by the EMS personnel (to nursing staff). History limited by: baseline dementia.      Clarisse Nobles is a 82 year old female with a history of fronto-temporal dementia and hypertension who presents with altered mental status. The patient was brought from her memory care today where she smiles and is nonverbal at baseline. Today she was showering where she then had a 10-30 second episode where her face contorted and staff called for EMS. EMS noted she was moving all extremities and acting at her baseline en route.    Daughter later provided the patient is DNR/DNI.     Independent Historian:   EMS - They report as noted above  Daughter - She [phone] reports patient is DNR/DNI and reports that she was told that at the assisted facility staff was attempting to bathe her mom when her mom became unresponsive.  She was told it lasted for 5 to 10 minutes.    Review of External Notes: Home care visit from 4/5/2023 which reveals patient has Alzheimer's dementia.    ROS:  Review of Systems   Unable to perform ROS: Dementia       Allergies:  The patient has no known allergies to medications.     Medications:   The patient denies any current medications.    Past Medical History:    SBO  Dehydration  Hypokalemia  Elevated lactic acid level  Osteoarthritis of knee  Psoriasis  Osteopenia  Fronto-temporal dementia  Memory loss  Hypertension  Enthesopathy of knee  Rosacea  Pure hypercholesterolemia    Past Surgical History:    Hysterectomy  Colonoscopy  Dermatofibroma removal  Excise left neck lipoma     Family History:    Breast cancer  Leukemia    Social History:  Patient came from her memory care.  Patient is unaccompanied in the ED.  PCP: No primary care provider on file.     Physical Exam     Patient Vitals for the past 24 hrs:   BP Temp Temp src Pulse Resp SpO2   04/13/23 0702 -- -- -- -- -- 95 %   04/13/23 0658 123/66 97  F (36.1  C)  Temporal 64 18 --        Physical Exam  General: Thin elderly woman in no acute distress   Eyes: PERRL, conjunctivae pink no scleral icterus or conjunctival injection  ENT:  Moist mucus membranes, posterior oropharynx clear without erythema or exudates  Respiratory:  Lungs clear to auscultation bilaterally, no crackles/rubs/wheezes.  Good air movement  CV: Normal rate and rhythm, no murmurs/rubs/gallops  GI:  Abdomen soft and non-distended.  Normoactive BS.  No tenderness, guarding or rebound  Skin: Warm, dry.  No rashes or petechiae  Musculoskeletal: No peripheral edema or calf tenderness  Neuro: Alert, speaks but nonsensical.  No apparent dysarthria.  No apparent facial droop or other cranial nerve deficits.  Moving all extremities.  Unable to cooperate with detailed neurologic examination.  Psychiatric: Calm and pleasant    Emergency Department Course     Laboratory:  Labs Ordered and Resulted from Time of ED Arrival to Time of ED Departure   COMPREHENSIVE METABOLIC PANEL - Abnormal       Result Value    Sodium 141      Potassium 4.2      Chloride 105      Carbon Dioxide (CO2) 25      Anion Gap 11      Urea Nitrogen 16.9      Creatinine 0.92      Calcium 9.5      Glucose 104 (*)     Alkaline Phosphatase 66      AST 18      ALT 16      Protein Total 6.3 (*)     Albumin 3.7      Bilirubin Total 0.6      GFR Estimate 61     CBC WITH PLATELETS AND DIFFERENTIAL    WBC Count 6.3      RBC Count 3.84      Hemoglobin 11.7      Hematocrit 35.7      MCV 93      MCH 30.5      MCHC 32.8      RDW 14.2      Platelet Count 303      % Neutrophils 58      % Lymphocytes 28      % Monocytes 9      % Eosinophils 3      % Basophils 1      % Immature Granulocytes 1      NRBCs per 100 WBC 0      Absolute Neutrophils 3.7      Absolute Lymphocytes 1.7      Absolute Monocytes 0.6      Absolute Eosinophils 0.2      Absolute Basophils 0.1      Absolute Immature Granulocytes 0.0      Absolute NRBCs 0.0          Emergency Department Course &  Assessments:     Interventions:  Medications - No data to display     Assessments:  0747 I tried to obtain history and examined the patient as noted above. Baseline dementia.  0753 I tried to call the patients daughter, She, to obtain history. Daughter did not  the phone.  0756 I spoke with She and discussed patient history. She would like to talk with her siblings and discuss goals of care.  0823 I spoke again with Jenni and discussed plan of care. She is comfortable with discharge and no further workup.    Independent Interpretation (X-rays, CTs, rhythm strip):  None    Consultations/Discussion of Management or Tests:  None     Social Determinants of Health affecting care:   None    Disposition:  The patient was discharged to her memory care.     Impression & Plan      CMS Diagnoses: None    Medical Decision Making:  Clarisse Nobles is a 82 year old female with history of advanced Alzheimer's dementia who had a spell of altered consciousness earlier today.  No history or signs of trauma on examination.  Exam is difficult due to her underlying advanced dementia but she does not appear to have any focal neurologic deficits, she is afebrile and vital signs are normal.  Laboratory studies were drawn per protocol and these were all reassuring.  I called and discussed with her daughter Zuleima regarding the patient's goals of care.  She conference with her siblings and report the patient is DNR/DNI and goals of care would be for limited intervention and so they declined additional EKG or head CT as they would not consent for further interventions.  I discussed with her that this could have been a cardiac event or could also have been a seizure.  They understand and are comfortable with her going back to her memory care facility given that her examination here is reassuring at this time.  I think this is appropriate given her underlying disease process and her goals.  Transport was arranged for the  patient will be transported back to her memory care facility.    Diagnosis:    ICD-10-CM    1. Spell of altered consciousness  R40.4            Discharge Medications:  New Prescriptions    No medications on file        Scribe Disclosure:  Reggie SMITH, am serving as a scribe at 7:54 AM on 4/13/2023 to document services personally performed by Lexie Portillo MD based on my observations and the provider's statements to me.       Lexie Portillo MD  04/13/23 1015

## 2023-04-13 NOTE — DISCHARGE INSTRUCTIONS
*You may resume diet and activities.  *No new medications. Continue your current medications  *Return if  worsening or concerning symptoms.

## 2023-04-13 NOTE — ED NOTES
Bed: ED13  Expected date:   Expected time:   Means of arrival:   Comments:  [facility] [address] 82F stroke symptoms eta 0679
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
## 2023-05-02 PROBLEM — I60.9 SUBARACHNOID HEMORRHAGE (H): Status: ACTIVE | Noted: 2023-01-01

## 2023-05-02 PROBLEM — S09.90XA INJURY OF HEAD, INITIAL ENCOUNTER: Status: ACTIVE | Noted: 2023-01-01

## 2023-05-02 NOTE — CONSULTS
Care Management Initial Consult    General Information  Assessment completed with: Care Team MemberMeredith, director of nursing  Type of CM/SW Visit: Initial Assessment    Primary Care Provider verified and updated as needed: Yes   Readmission within the last 30 days: no previous admission in last 30 days      Reason for Consult: discharge planning  Advance Care Planning: Advance Care Planning Reviewed: no concerns identified          Communication Assessment  Patient's communication style: spoken language (English or Bilingual)             Cognitive  Cognitive/Neuro/Behavioral: .WDL except  Level of Consciousness: confused (baseline dementia)  Arousal Level: opens eyes spontaneously  Orientation: disoriented x 4  Mood/Behavior: calm, cooperative  Best Language: 2 - Severe aphasia  Speech: illogical    Living Environment:   People in home: facility resident     Current living Arrangements: assisted living (memory cares)  [facility] of Facility: [facility]   Able to return to prior arrangements: yes       Family/Social Support:  Care provided by: other (see comments) (facility staff)  Provides care for: no one, unable/limited ability to care for self  Marital Status:   Children          Description of Support System:           Current Resources:   Patient receiving home care services:       Community Resources:    Equipment currently used at home:    Supplies currently used at home:      Employment/Financial:  Employment Status:          Financial Concerns:             Does the patient's insurance plan have a 3 day qualifying hospital stay waiver?  No    Lifestyle & Psychosocial Needs:  Social Determinants of Health     Tobacco Use: Not on file   Alcohol Use: Not on file   Financial Resource Strain: Not on file   Food Insecurity: Not on file   Transportation Needs: Not on file   Physical Activity: Not on file   Stress: Not on file   Social Connections: Not on file   Intimate Partner Violence: Not on  file   Depression: Not on file   Housing Stability: Not on file       Functional Status:  Prior to admission patient needed assistance: yes    Mental Health Status:          Chemical Dependency Status:                Values/Beliefs:  Spiritual, Cultural Beliefs, Confucianism Practices, Values that affect care:                 Additional Information:  CM consult for return to memory care facility.  Patient admitted after a fall and found to have small SAH.  Patient is Observation status.  Per physicians, she is stable this morning to return to her facility.  Contacted GIL Harper at [facility]. She states patient is nonverbal and requires assistance.  She is able to ambulate with a walker. Updated her that patient was able to get out of bed with nurse and take a couple of steps.  Asked if they would require a PT eval and she stated that was not necessary and patient could return.  She would like orders faxed to [phone].  Also contacted patient's son Pako.  He was in agreement with her returning if cleared by physician.  He requests she eats before leaving.  He stated she does need to be fed.  He agrees with stretcher transport being set up at discharge.      Addendum @ 114:  Stretcher transport scheduled with [facility] for time window of 2154-5545 today.  Left VM for Meredith at [facility] ([phone]).      Addendum @ 1222:  Orders faxed to memory care facility.  Awaiting call back from GIL to confirm discharge time.    PCS completed and faxed.    Addendum @ 1322:    Care Management Discharge Note    Discharge Date: 05/02/2023       Discharge Disposition: Assisted Living (memory care)    Discharge Services:      Discharge DME:      Discharge Transportation:      Private pay costs discussed: Not applicable    Does the patient's insurance plan have a 3 day qualifying hospital stay waiver?  No    PAS Confirmation Code:    Patient/family educated on Medicare website which has  current facility and service quality ratings:      Education Provided on the Discharge Plan:   Persons Notified of Discharge Plans: Meredith at [facility], son Pako  Patient/Family in Agreement with the Plan: yes    Handoff Referral Completed: No    Additional Information:  Meredith and son updated of discharge time and in agreement.  No other CM interventions anticipated.    Michelle Villeda RN, BSN, PHN  Inpatient Care Coordination  [facility]  Phone: [phone]

## 2023-05-02 NOTE — PROGRESS NOTES
Contacted regarding 83 yo female presenting to ER after unwitnessed call at [facility].  Head CT with small SAH    IMPRESSION:  HEAD CT:  1.  Focal subarachnoid hemorrhage along the right parietal convexity.  2.  Generalized volume loss and sequelae of chronic microvascular ischemic disease, as described.     FACIAL BONE CT:  1.  No facial bone or mandibular fracture.     CERVICAL SPINE CT:  1.  No evidence of an acute displaced fracture.  2.  Degenerative changes, as described.  3.  Right thyroid nodule measuring 3 cm in diameter.    RECOMMENDATIONS:  Admit for observation.  Repeat head CT at 6 AM  Blood pressure less than 150 systolic.  Every 4 hour neuro checks.    ROBBI Ferraro  [facility]  [address] [address]    Tel [phone]  Pager [phone]

## 2023-05-02 NOTE — TELEPHONE ENCOUNTER
Patient seen in ED by our team for SAH. They recommended patient follow up in 1 month with CT prior. (Scheduled for 6/2).    Patient son calls today with transportation concerns and questions.     If patient unable to travel, can complete CT at her facility and have telephone appointment with our team. If they want to come to clinic they will need to arrange medical rides through their insurance.     Called patients son to provide update and discuss. He verbalized understanding. He will discuss with his sisters and get back to us.

## 2023-05-02 NOTE — PROGRESS NOTES
RECEIVING UNIT ED HANDOFF REVIEW    ED Nurse Handoff Report was reviewed by: Briseyda Andrews RN on May 2, 2023 at 9:17 AM

## 2023-05-02 NOTE — PLAN OF CARE
Patient discharged. Discontinued IV. Patient unable to understand discharge instructions due to advanced dementia but CM called pt son and memory care facility and answered all their questions and all in agreement with discharge back to memory care. Transport back to facility arranged by CM for between 2-3 pm.

## 2023-05-02 NOTE — PROGRESS NOTES
Imaging reviewed    Plan for follow up in 1 month with head CT prior   Our office will coordinate   Dr. Avelar in agreement   Our team will sign off at this time; cleared for dischage from nsgy standpoint     CT SCAN OF THE HEAD WITHOUT CONTRAST   5/2/2023 8:06 AM      HISTORY: Followup SAH.     TECHNIQUE: Axial images of the head and coronal reformations without  IV contrast material. Radiation dose for this scan was reduced using  automated exposure control, adjustment of the mA and/or kV according  to patient size, or iterative reconstruction technique.     COMPARISON: Head CT 5/1/2023                                                                      IMPRESSION: No change. No change of small area of hyperattenuation  along the right parietal lobe, presumed acute subarachnoid hemorrhage.  No new areas of hemorrhage.

## 2023-05-02 NOTE — ED NOTES
[facility]  ED Nurse Handoff Report    ED Chief complaint: Head Injury      ED Diagnosis:   Final diagnoses:   Injury of head, initial encounter   Fall, initial encounter   Subarachnoid hemorrhage (H)       Code Status: Admitting MD to discuss    Allergies: No Known Allergies    Patient Story: Pt arrived via EMS presenting after a fall.EMS reports pt went to bed at 8pm and staff found pt around 10pm on floor next to bed with abrasions to face. Staff reports baseline confusion.   Focused Assessment:  Disoriented x4, speech is incoherent. 22g L arm. Abrasions to face. Hypertensive. Pt gets agitated with cares.     Labs Ordered and Resulted from Time of ED Arrival to Time of ED Departure   COMPREHENSIVE METABOLIC PANEL - Abnormal       Result Value    Sodium 140      Potassium 4.2      Chloride 105      Carbon Dioxide (CO2) 24      Anion Gap 11      Urea Nitrogen 23.2 (*)     Creatinine 0.90      Calcium 9.7      Glucose 91      Alkaline Phosphatase 73      AST 24      ALT 17      Protein Total 7.1      Albumin 4.0      Bilirubin Total 0.4      GFR Estimate 63     ROUTINE UA WITH MICROSCOPIC REFLEX TO CULTURE - Abnormal    Color Urine Light Yellow      Appearance Urine Clear      Glucose Urine Negative      Bilirubin Urine Negative      Ketones Urine 20 (*)     Specific Gravity Urine 1.024      Blood Urine Negative      pH Urine 5.5      Protein Albumin Urine Negative      Urobilinogen Urine Normal      Nitrite Urine Negative      Leukocyte Esterase Urine Moderate (*)     Mucus Urine Present (*)     RBC Urine 1      WBC Urine 14 (*)     Squamous Epithelials Urine 1      Hyaline Casts Urine 1     CBC WITH PLATELETS AND DIFFERENTIAL - Abnormal    WBC Count 11.1 (*)     RBC Count 4.17      Hemoglobin 12.5      Hematocrit 39.0      MCV 94      MCH 30.0      MCHC 32.1      RDW 14.4      Platelet Count 331      % Neutrophils 68      % Lymphocytes 17      % Monocytes 10      % Eosinophils 4      % Basophils 1       % Immature Granulocytes 0      NRBCs per 100 WBC 0      Absolute Neutrophils 7.6      Absolute Lymphocytes 1.9      Absolute Monocytes 1.1      Absolute Eosinophils 0.4      Absolute Basophils 0.1      Absolute Immature Granulocytes 0.0      Absolute NRBCs 0.0     INR - Normal    INR 1.05     URINE CULTURE     CT Head w/o Contrast   Final Result   IMPRESSION:   HEAD CT:   1.  Focal subarachnoid hemorrhage along the right parietal convexity.   2.  Generalized volume loss and sequelae of chronic microvascular ischemic disease, as described.      FACIAL BONE CT:   1.  No facial bone or mandibular fracture.      CERVICAL SPINE CT:   1.  No evidence of an acute displaced fracture.   2.  Degenerative changes, as described.   3.  Right thyroid nodule measuring 3 cm in diameter.      REFERENCE:   Donald JUNG et al. Managing Incidental Thyroid Nodules Detected on Imaging: White Paper of the ACR Incidental Thyroid Findings Committee. JACR 2015; 12:143-150.      Incidental thyroid nodule detected on CT or MRI without suspicious findings. Applies to general population without limited life expectancy or significant comorbidities.      Age greater than or equal to 35 years   Less than 1.5 cm: No further evaluation.   Greater than or equal to 1.5 cm: Evaluate with thyroid ultrasound.      CT head findings were discussed with Dr. Charlene Ruelas at 12:52 AM on 05/02/2023.      CT Facial Bones without Contrast   Final Result   IMPRESSION:   HEAD CT:   1.  Focal subarachnoid hemorrhage along the right parietal convexity.   2.  Generalized volume loss and sequelae of chronic microvascular ischemic disease, as described.      FACIAL BONE CT:   1.  No facial bone or mandibular fracture.      CERVICAL SPINE CT:   1.  No evidence of an acute displaced fracture.   2.  Degenerative changes, as described.   3.  Right thyroid nodule measuring 3 cm in diameter.      REFERENCE:   Donald JUNG et al. Managing Incidental Thyroid Nodules Detected on  Imaging: White Paper of the ACR Incidental Thyroid Findings Committee. JACR 2015; 12:143-150.      Incidental thyroid nodule detected on CT or MRI without suspicious findings. Applies to general population without limited life expectancy or significant comorbidities.      Age greater than or equal to 35 years   Less than 1.5 cm: No further evaluation.   Greater than or equal to 1.5 cm: Evaluate with thyroid ultrasound.      CT head findings were discussed with Dr. Charlene Ruelas at 12:52 AM on 05/02/2023.      CT Cervical Spine w/o Contrast   Final Result   IMPRESSION:   HEAD CT:   1.  Focal subarachnoid hemorrhage along the right parietal convexity.   2.  Generalized volume loss and sequelae of chronic microvascular ischemic disease, as described.      FACIAL BONE CT:   1.  No facial bone or mandibular fracture.      CERVICAL SPINE CT:   1.  No evidence of an acute displaced fracture.   2.  Degenerative changes, as described.   3.  Right thyroid nodule measuring 3 cm in diameter.      REFERENCE:   Donald ROCHAK et al. Managing Incidental Thyroid Nodules Detected on Imaging: White Paper of the ACR Incidental Thyroid Findings Committee. JACR 2015; 12:143-150.      Incidental thyroid nodule detected on CT or MRI without suspicious findings. Applies to general population without limited life expectancy or significant comorbidities.      Age greater than or equal to 35 years   Less than 1.5 cm: No further evaluation.   Greater than or equal to 1.5 cm: Evaluate with thyroid ultrasound.      CT head findings were discussed with Dr. Charlene Ruelas at 12:52 AM on 05/02/2023.      XR Pelvis 1/2 Views   Final Result   IMPRESSION: No visualized acute fracture, malalignment or other acute osseous abnormality of the pelvis.             Treatments and/or interventions provided:   Patient's response to treatments and/or interventions: Tolerated    To be done/followed up on inpatient unit:  Admitting MD orders    Does this patient have any  cognitive concerns?: Dementia    Activity level - Baseline/Home:  Total Care  Activity Level - Current:   Total Care    Patient's Preferred language: English   Needed?: No    Isolation: None  Infection: Not Applicable  Patient tested for COVID 19 prior to admission: NO  Bariatric?: No    Vital Signs:   Vitals:    05/01/23 2323 05/01/23 2326 05/02/23 0033   BP: (!) 198/98  (!) 169/116   Pulse: 90  87   Resp:  16    Temp:  97.1  F (36.2  C)    TempSrc:  Temporal    SpO2:  98%        Cardiac Rhythm:     Was the PSS-3 completed:   Yes  What interventions are required if any?               Family Comments: None present  OBS brochure/video discussed/provided to patient/family: No             For the majority of the shift this patient's behavior was Green.   Behavioral interventions performed were Frequent rounding.    ED NURSE PHONE NUMBER: *[phone]

## 2023-05-02 NOTE — H&P
[facility]    History and Physical - Hospitalist Service       Date of Admission:  5/1/2023    Assessment & Plan      Clarisse Nobles is a 82 year old female admitted on 5/1/2023. She presents after an unwitnessed fall at her memory care facility.  Found to have facial abrasions as well as a subarachnoid hemorrhage.    Traumatic subarachnoid hemorrhage: Unwitnessed fall at [facility] care facility.  Patient unable to provide any ancillary history in the setting of her dementia.  Found to have facial abrasions and subarachnoid hemorrhage on CT imaging.  -Neurosurgery consulted for trauma evaluation, aware of patient from the emergency department.  Discussed potential interventions with son, Pako.  If acute worsening, they would not pursue neurosurgical intervention.  Goal would be for comfort at that time, and Pako would request a  administer last rights as able.  -Repeat CT of head without contrast time for 6 AM  -Every 4 hours neurochecks  -Goal systolic blood pressure less than 150.  Hydralazine and labetalol are ordered as needed.  Recommend manual blood pressure cuff as patient is startled by automated cuff pressures with her dementia.     Frontotemporal dementia: Significant decline over the past 4 years per son.  Patient's quality of life is described as poor, though she is generally pleasant.  Rarely recognizes family, and speech is generally nonmeaningful.  Son reports that she has been slowly losing weight and is becoming more deconditioned.  -Fall precautions  -Patient may be need a bedside sitter or long-arm sit pending any witnessed impulsivity.  Per son, she typically follows directions well  -Discussed patient's slow progressive weight loss with son over the telephone.  She might qualify for hospice in the future based on this, and can be discussed further with her primary care team.  -Anticipate discharge back to memory care facility at [facility]  today versus tomorrow  -Resume prior to admission BuSpar when reconciled by pharmacy     Psoriatic arthritis:  -Resume prior to admission leflunomide, topical treatments at discharge    Thyroid nodule:  -Can pursue outpatient thyroid ultrasound through primary care team if felt consistent with goals of care.  I anticipate family will opt not to pursue this with her progressive dementia.       Diet:   NPO per anesthesia guidelines until repeat head CT in a.m.  If stable/improved, regular diet  DVT Prophylaxis: Ambulate every shift  Bergeron Catheter: Not present  Lines: None     Cardiac Monitoring: None  Code Status:  DNR/DNI.  Confirmed with patient's son, Pako, over the telephone    Clinically Significant Risk Factors Present on Admission                               Disposition Plan      Anticipate 5/2/2023 evening pending stable CT imaging, neurosurgery evaluation, blood pressure control    Truong العراقي MD  Hospitalist Service  [facility]  Securely message with [employer] (more info)  Text page via [facility] Paging/Directory     ______________________________________________________________________    Chief Complaint   Unwitnessed fall at [facility] facility    History obtained from chart review, discussion with Dr. Ruelas in the emergency department, discussion with patient's son, Pako, over the telephone.  Patient is not able to provide any meaningful history in the setting of her dementia.  She is not oriented to self, situation, or place.    History of Present Illness   Clarisse Nobles is a 82 year old female who is brought to the emergency department from her memory care facility at [facility] after an unwitnessed fall.  Sometime between 8:30 PM when patient was put to bed and 10 PM rounds patient got up with her walker and fell to the ground.  Per son, and EMS report, patient getting up out of bed at night is very atypical for her.  It was suspected that patient was  "getting up to go to the bathroom.  Patient with some notable facial abrasions, and on head CT, was found to have a small right parietal subarachnoid hemorrhage.    Discussed finding with patient's son, Pako, over the telephone.  He confirms a DNR/DNI status.  When discussing potential interventions, he tells me that they would not like to pursue neurosurgical intervention if clinical decompensation, rather focus on comfort.  He describes patient's quality of life is poor, and tells me that she no longer recognizes family with any regularity.  She continues to lose weight from deconditioning, though generally is in good spirits both at her care facility, where he feels she gets good care, as well as when she is in the hospital.  Recently seen in the emergency department for staring spell approximately 2 weeks ago, discharged back to facility given her desire for limited interventions.    Patient is not on aspirin or any blood thinners.  Pako tells me that she takes BuSpar for agitation, leflunomide for her psoriatic arthritis, and a topical cream which he cannot recall the name of, also for her psoriasis.  No other medications.    Patient does not appear to be in any pain.  As above, she cannot provide any narrative history.  She speaks in phrases, though these are mostly nonsensical.  When I ask her orientation questions, she responds \"I'm over there,\" when I ask where she is and \"what kind of place is this?\"     Blood pressures were noted to be elevated in the emergency department and with EMS, though her son tells me she has never had an issue with elevated blood pressure.  She does have significant startle with blood pressure cuff running so it is possible that automated cuff is resulting in artificially elevated blood pressures.      Past Medical History    History of small bowel obstruction  Frontotemporal dementia  Psoriatic arthritis  Hyperlipidemia  Rosacea  Hypertension  Osteopenia      Past Surgical " History   Hysterectomy  Prior lipoma and dermatofibroma removal from neck and back    Prior to Admission Medications   Leflunomide  BuSpar  Topical cream for psoriasis         Physical Exam   Vital Signs: Temp: 97.1  F (36.2  C) Temp src: Temporal BP: (!) 169/116 Pulse: 87   Resp: 16 SpO2: 98 % O2 Device: None (Room air)    Weight: 0 lbs 0 oz    General Appearance: Frail appearing 82-year-old female in no distress, but with overt facial abrasions.  Eyes: No scleral icterus or injection  HEENT: Abrasions of forehead, nasal bridge.  Respiratory: Poor effort, though breath sounds are clear bilaterally to auscultation.  Cardiovascular: Regular rate and rhythm without murmur.  Heart rate in the 80s.  GI: Abdomen soft, nontender to palpation with no palpable mass.  Lymph/Hematologic: Trace to 1+ bilateral lower extremity edema  Skin: Patient with some psoriatic plaques on her shins bilaterally.  No jaundice  Musculoskeletal: Diffuse muscular wasting and subcutaneous fat loss.  Appears frail  Neurologic: Pleasantly confused.  Moves all extremities without difficulty and sits up independently  Psychiatric: Patient is not oriented to self, situation, place.    Medical Decision Making       65 MINUTES SPENT BY ME on the date of service doing chart review, history, exam, documentation & further activities per the note.      Data     I have personally reviewed the following data over the past 24 hrs:    11.1 (H)  \   12.5   / 331     140 105 23.2 (H) /  91   4.2 24 0.90 \       ALT: 17 AST: 24 AP: 73 TBILI: 0.4   ALB: 4.0 TOT PROTEIN: 7.1 LIPASE: N/A       Imaging results reviewed over the past 24 hrs:   Recent Results (from the past 24 hour(s))   XR Pelvis 1/2 Views    Narrative    EXAM: PELVIS SINGLE VIEW  LOCATION: [facility]  DATE/TIME: 5/1/2023 11:44 PM CDT    INDICATION: Fall. Left leg problem. Dementia.  COMPARISON: None.      Impression    IMPRESSION: No visualized acute fracture, malalignment  or other acute osseous abnormality of the pelvis.    CT Cervical Spine w/o Contrast    Narrative    EXAM: CT HEAD W/O CONTRAST, CT FACIAL BONES WITHOUT CONTRAST, CT CERVICAL SPINE W/O CONTRAST  LOCATION: [facility]  DATE/TIME: 5/2/2023 12:20 AM CDT    INDICATION: Traumatic injury.  COMPARISON: 12/18/2020  TECHNIQUE:   1) Routine CT Head without IV contrast. Multiplanar reformats. Dose reduction techniques were used.  2) Routine CT Facial Bones without IV contrast. Multiplanar reformats. Dose reduction techniques were used.  3) Routine CT Cervical Spine without IV contrast. Multiplanar reformats. Dose reduction techniques were used.    FINDINGS:  HEAD CT:   INTRACRANIAL CONTENTS: Focal subarachnoid hemorrhage along the right parietal convexity (series 6, image 24). No CT evidence of acute infarct. Severe presumed chronic small vessel ischemic changes. Moderate generalized volume loss. No hydrocephalus.     OSSEOUS STRUCTURES/SOFT TISSUES: Left frontal scalp swelling. No acute calvarial fracture.     FACIAL BONE CT:  OSSEOUS STRUCTURES/SOFT TISSUES: Left frontal scalp swelling. No facial bone fracture or malalignment. Dental structures are partially obscured by artifact from dental restorations.     ORBITAL CONTENTS: Prior bilateral cataract surgery. Visualized portions of the orbits are otherwise unremarkable.    SINUSES: Mild mucosal thickening scattered about the paranasal sinuses.    CERVICAL SPINE CT:   VERTEBRA: 3 mm anterolisthesis of C4 on C5 is favored to be secondary to degenerative facet arthropathy. No definite acute displaced fracture.     CANAL/FORAMINA: Multilevel degenerative changes, without high-grade spinal canal stenosis. Right greater than left neural foraminal narrowing.    PARASPINAL: No prevertebral hematoma. 3 cm right thyroid nodule. Lung apices are clear.      Impression    IMPRESSION:  HEAD CT:  1.  Focal subarachnoid hemorrhage along the right parietal  convexity.  2.  Generalized volume loss and sequelae of chronic microvascular ischemic disease, as described.    FACIAL BONE CT:  1.  No facial bone or mandibular fracture.    CERVICAL SPINE CT:  1.  No evidence of an acute displaced fracture.  2.  Degenerative changes, as described.  3.  Right thyroid nodule measuring 3 cm in diameter.    REFERENCE:  Donald JUGN et al. Managing Incidental Thyroid Nodules Detected on Imaging: White Paper of the ACR Incidental Thyroid Findings Committee. JACR 2015; 12:143-150.    Incidental thyroid nodule detected on CT or MRI without suspicious findings. Applies to general population without limited life expectancy or significant comorbidities.    Age greater than or equal to 35 years  Less than 1.5 cm: No further evaluation.  Greater than or equal to 1.5 cm: Evaluate with thyroid ultrasound.    CT head findings were discussed with Dr. Charlene Ruelas at 12:52 AM on 05/02/2023.   CT Facial Bones without Contrast    Narrative    EXAM: CT HEAD W/O CONTRAST, CT FACIAL BONES WITHOUT CONTRAST, CT CERVICAL SPINE W/O CONTRAST  LOCATION: [facility]  DATE/TIME: 5/2/2023 12:20 AM CDT    INDICATION: Traumatic injury.  COMPARISON: 12/18/2020  TECHNIQUE:   1) Routine CT Head without IV contrast. Multiplanar reformats. Dose reduction techniques were used.  2) Routine CT Facial Bones without IV contrast. Multiplanar reformats. Dose reduction techniques were used.  3) Routine CT Cervical Spine without IV contrast. Multiplanar reformats. Dose reduction techniques were used.    FINDINGS:  HEAD CT:   INTRACRANIAL CONTENTS: Focal subarachnoid hemorrhage along the right parietal convexity (series 6, image 24). No CT evidence of acute infarct. Severe presumed chronic small vessel ischemic changes. Moderate generalized volume loss. No hydrocephalus.     OSSEOUS STRUCTURES/SOFT TISSUES: Left frontal scalp swelling. No acute calvarial fracture.     FACIAL BONE CT:  OSSEOUS  STRUCTURES/SOFT TISSUES: Left frontal scalp swelling. No facial bone fracture or malalignment. Dental structures are partially obscured by artifact from dental restorations.     ORBITAL CONTENTS: Prior bilateral cataract surgery. Visualized portions of the orbits are otherwise unremarkable.    SINUSES: Mild mucosal thickening scattered about the paranasal sinuses.    CERVICAL SPINE CT:   VERTEBRA: 3 mm anterolisthesis of C4 on C5 is favored to be secondary to degenerative facet arthropathy. No definite acute displaced fracture.     CANAL/FORAMINA: Multilevel degenerative changes, without high-grade spinal canal stenosis. Right greater than left neural foraminal narrowing.    PARASPINAL: No prevertebral hematoma. 3 cm right thyroid nodule. Lung apices are clear.      Impression    IMPRESSION:  HEAD CT:  1.  Focal subarachnoid hemorrhage along the right parietal convexity.  2.  Generalized volume loss and sequelae of chronic microvascular ischemic disease, as described.    FACIAL BONE CT:  1.  No facial bone or mandibular fracture.    CERVICAL SPINE CT:  1.  No evidence of an acute displaced fracture.  2.  Degenerative changes, as described.  3.  Right thyroid nodule measuring 3 cm in diameter.    REFERENCE:  Donald JUNG et al. Managing Incidental Thyroid Nodules Detected on Imaging: White Paper of the ACR Incidental Thyroid Findings Committee. JACR 2015; 12:143-150.    Incidental thyroid nodule detected on CT or MRI without suspicious findings. Applies to general population without limited life expectancy or significant comorbidities.    Age greater than or equal to 35 years  Less than 1.5 cm: No further evaluation.  Greater than or equal to 1.5 cm: Evaluate with thyroid ultrasound.    CT head findings were discussed with Dr. Charlene Ruelas at 12:52 AM on 05/02/2023.   CT Head w/o Contrast    Narrative    EXAM: CT HEAD W/O CONTRAST, CT FACIAL BONES WITHOUT CONTRAST, CT CERVICAL SPINE W/O CONTRAST  LOCATION: [facility]  DATE/TIME: 5/2/2023 12:20 AM CDT    INDICATION: Traumatic injury.  COMPARISON: 12/18/2020  TECHNIQUE:   1) Routine CT Head without IV contrast. Multiplanar reformats. Dose reduction techniques were used.  2) Routine CT Facial Bones without IV contrast. Multiplanar reformats. Dose reduction techniques were used.  3) Routine CT Cervical Spine without IV contrast. Multiplanar reformats. Dose reduction techniques were used.    FINDINGS:  HEAD CT:   INTRACRANIAL CONTENTS: Focal subarachnoid hemorrhage along the right parietal convexity (series 6, image 24). No CT evidence of acute infarct. Severe presumed chronic small vessel ischemic changes. Moderate generalized volume loss. No hydrocephalus.     OSSEOUS STRUCTURES/SOFT TISSUES: Left frontal scalp swelling. No acute calvarial fracture.     FACIAL BONE CT:  OSSEOUS STRUCTURES/SOFT TISSUES: Left frontal scalp swelling. No facial bone fracture or malalignment. Dental structures are partially obscured by artifact from dental restorations.     ORBITAL CONTENTS: Prior bilateral cataract surgery. Visualized portions of the orbits are otherwise unremarkable.    SINUSES: Mild mucosal thickening scattered about the paranasal sinuses.    CERVICAL SPINE CT:   VERTEBRA: 3 mm anterolisthesis of C4 on C5 is favored to be secondary to degenerative facet arthropathy. No definite acute displaced fracture.     CANAL/FORAMINA: Multilevel degenerative changes, without high-grade spinal canal stenosis. Right greater than left neural foraminal narrowing.    PARASPINAL: No prevertebral hematoma. 3 cm right thyroid nodule. Lung apices are clear.      Impression    IMPRESSION:  HEAD CT:  1.  Focal subarachnoid hemorrhage along the right parietal convexity.  2.  Generalized volume loss and sequelae of chronic microvascular ischemic disease, as described.    FACIAL BONE CT:  1.  No facial bone or mandibular fracture.    CERVICAL SPINE CT:  1.  No evidence of an acute  displaced fracture.  2.  Degenerative changes, as described.  3.  Right thyroid nodule measuring 3 cm in diameter.    REFERENCE:  Donald ROCHAK et al. Managing Incidental Thyroid Nodules Detected on Imaging: White Paper of the ACR Incidental Thyroid Findings Committee. JACR 2015; 12:143-150.    Incidental thyroid nodule detected on CT or MRI without suspicious findings. Applies to general population without limited life expectancy or significant comorbidities.    Age greater than or equal to 35 years  Less than 1.5 cm: No further evaluation.  Greater than or equal to 1.5 cm: Evaluate with thyroid ultrasound.    CT head findings were discussed with Dr. Charlene Ruelas at 12:52 AM on 05/02/2023.

## 2023-05-02 NOTE — CONSULTS
Consult Date: 05/01/2023      IMPRESSION:  An 82-year-old female presenting after an unwitnessed fall with a small right parietal traumatic subarachnoid hemorrhage.    PLAN:  From the neurosurgical standpoint, no plan for any operative neurosurgical intervention, every 4-hour neuro checks.  Repeat the head CT this morning. If stable, can likely discharge back to the [facility] care facility with a followup in our office in 1 month with a head CT the day of the appointment.    TYPE OF VISIT:  The neurosurgical service was consulted see Mrs. Nobles for traumatic subarachnoid hemorrhage.    HISTORY OF PRESENT ILLNESS:  Mrs. Nobles is an 82-year-old female with a past medical history of dementia, hypertension, rosacea, psoriatic arthritis, thyroid nodule, who presented to the emergency room from [facility] for an unwitnessed fall.   She was in bed at 8:00 p.m. and then found by staff on the ground at 10:00 p.m. with abrasions to the face. She was brought to the emergency room where head CT revealed a small subarachnoid hemorrhage and neurosurgery was consulted.  Currently, no complaints, but again underlying dementia.    PAST MEDICAL HISTORY:  Dementia, motor neuron disease, osteoporosis, psoriasis, frontotemporal dementia, hypertension, rosacea.    PAST SURGICAL HISTORY:  Hysterectomy, colonoscopy, dermatofibroma removal, excision of the left neck.    SOCIAL HISTORY:  Lives in a memory care facility, has a son who is involved in her care.    MEDICATIONS:  Reviewed per the electronic medical record, not including any anticoagulation.    ALLERGIES:  NO KNOWN DRUG ALLERGIES.    PHYSICAL EXAMINATION:    VITAL SIGNS:  Temperature is 97, blood pressure is 145/100, pulse is 87, respiratory rate is 16.  GENERAL:  She is sleeping upon my entering the room, she wakes up easily.  She says a few words, but is not oriented to person and place.  She spontaneously moves all 4 extremities, but does not follow commands well.   Pupils are equal, round, and reactive.    LABORATORIES:  INR is 1.05.  Sodium is 140.  White count is 11.    IMAGING STUDIES:  Included a head CT performed last evening showing a focal subarachnoid hemorrhage in the right parietal convexity.  CT facial bone unremarkable.  CT cervical spine, degenerative changes.    TOTAL TIME SPENT WITH THE PATIENT:  70 minutes, including 50 minutes of counseling and coordination of care.  Discussed with Dr. Avelar.    Dictated by SARITA BARNES        D: 2023   T: 2023   MT: stef    Name:     ALMITA CORTES  MRN:      [id number]        Account:      [id number]   :      1940           Consult Date: 2023     Document: [id number]

## 2023-05-02 NOTE — DISCHARGE SUMMARY
[facility]    Discharge Summary  Hospitalist    Date of Admission:  5/1/2023  Date of Discharge:  5/2/2023  Discharging Provider: Jennifer Lockhart MD  Date of Service (when I saw the patient): 05/02/23      History of Present Illness   Clarisse Nobles is a 82 year old female admitted on 5/1/2023. She presents after an unwitnessed fall at her memory care facility.  Found to have facial abrasions as well as a subarachnoid hemorrhage.    Hospital Course   Clarisse Nobles was admitted on 5/1/2023.  The following problems were addressed during her hospitalization:    Fall  Traumatic subarachnoid hemorrhage  Facial Abrasions: Unwitnessed fall at [facility] facility.  Patient unable to provide any ancillary history in the setting of her dementia.  Found to have facial abrasions and subarachnoid hemorrhage on CT imaging.  -Neurosurgery consulted for trauma evaluation, aware of patient from the emergency department.    Admitting hospitalist discussed potential interventions with son, Pako.  If acute worsening, they would not pursue neurosurgical intervention.  Goal would be for comfort at that time, and Pako would request a  administer last rights as able.  -UA WBC 14, asymptomatic.  -Repeat CT of head without contrast time for 6 AM, stable, no new acute findings, no new hemorrhage.  -Every 4 hours neurochecks stable, at discharge patient arises/transfers with wheeled walker and takes steps with direct assistance, affect happy, comfortable, mentation consistent with dementia. Per her memory care facility this is baseline.  -Goal systolic blood pressure less than 150.  At discharge /76.  SW contacted her memory care regarding condition and physical ability, see above, at baseline.  Discussed with son Pako regarding second head CT this morning which is stable without acute findings, reviewed by Neurosurgery and stable and patient is discharged back to her memory care with previous  care plan and her previous medications.  Local care to facial abrasions.  [facility]iguel follows patient in memory care for which they will see her in 1 week for hospital follow-up, BP check as well as hemoglobin.  Neurosurgery will see patient follow-up in 1 month with head CT, they will call patient regarding appointment date and time.       Frontotemporal dementia: Significant decline over the past 4 years per son.  Patient's quality of life is described as poor, though she is generally pleasant.  Rarely recognizes family, and speech is generally nonmeaningful.  Son reports that she has been slowly losing weight and is becoming more deconditioned.  -Fall precautions   Per son, she typically follows directions well  -as above, discharge back to memory care facility at [facility] today   -Resume prior to admission BuSpar at discharge    Psoriatic arthritis:  -Resume prior to admission leflunomide, topical treatments at discharge     Thyroid nodule:  -Can pursue outpatient thyroid ultrasound through primary care team if felt consistent with goals of care.        Code Status   DNR / DNI       Primary Care Physician   [facility]    Physical Exam   Temp: 97.1  F (36.2  C) Temp src: Temporal BP: 137/76 Pulse: 72   Resp: 20 SpO2: 96 % O2 Device: None (Room air)    Vitals:    05/02/23 0835   Weight: 65.8 kg (145 lb)     General/Constitutional:   NAD, awake, oriented x 0; , calm, cooperative, happy, appears comfortable.  HEENT facial abrasion on crest of nose and below left eye, no swelling, erythema, open wound.  Chest/Respiratory: Respirations nonlabored room air  Cardiovascular:  regular, no murmur appreciated.   Gastrointestinal/Abdomen:  soft, nontender,   Neuro.  Gross motor tested, nonfocal, transfers and ambulates, full weightbearing with direct assistance.  Psych oriented x 0 , affect calm      Discharge Disposition   Discharged to back to Memory Care  Condition at discharge:  Fair    Consultations This Hospital Stay   NEUROSURGERY IP CONSULT  CARE MANAGEMENT / SOCIAL WORK IP CONSULT    Time Spent on this Encounter   SARAH, Jennifer Lockhart MD, personally saw the patient today and spent greater than 30 minutes discharging this patient discussing discharge plans with Nursing, Care Coordinator and son Pako, discharge and transfer back to [facility], coordinating with Specialties Neurosurgery regarding discharge recommendations and follow-up; completing discharge orders, medications and follow-up.    Discharge Orders      CT Head w/o Contrast     Follow-up and recommended labs and tests     Your Neurosurgical follow up appointments have been recommended in 1 month with head CT prior. You may call [phone] to make, confirm or change your follow-up Neurosurgery appointment dates and/or times.     Activity    Your activity upon discharge: Limit heavy lifting until follow up visit. Ok to walk as tolerated. No high impact activities until follow-up visit.     Reason for your hospital stay    Presented from Memory Care Facility after a fall with facial abrasions and subarachnoid hemorrhage     Follow-up and recommended labs and tests     RESUME all prior medications and care plan at [facility]    Follow up with primary care provider, [facility], within 7 days for hospital follow- up.  The following labs/tests are recommended: CBC    Follow-up NeuroSurgery at 1 months with head CAT scan prior to appointment.  they will call you for appointment date and time.  If you do not hear from them call phone # on Summary.     .     Activity    Your activity upon discharge: with Assistance.  Resume prior care plan at [facility] including scheduled toileting.     Diet    Follow this diet upon discharge:       Regular Diet Adult with direct feed/assistance with all POs     Discharge Medications   Current Discharge Medication List      CONTINUE these medications which have NOT CHANGED     Details   acetaminophen (TYLENOL) 500 MG tablet Take 500-1,000 mg by mouth every 6 hours as needed for mild pain      Ascorbic Acid (VITAMIN C) 500 MG CAPS Take 500 mg by mouth daily      busPIRone (BUSPAR) 7.5 MG tablet Take 7.5 mg by mouth 2 times daily      calcipotriene (DOVONOX) 0.005 % external cream Apply topically 2 times daily      leflunomide (ARAVA) 10 MG tablet Take 10 mg by mouth daily      melatonin 1 MG TABS tablet Take 1 mg by mouth At Bedtime      menthol-zinc oxide (CALMOSEPTINE) 0.44-20.6 % OINT ointment Apply topically 2 times daily      multivitamin, therapeutic (THERA-VIT) TABS tablet Take 1 tablet by mouth daily      Vitamin D3 (CHOLECALCIFEROL) 25 mcg (1000 units) tablet Take 1 tablet by mouth daily           Allergies   Allergies   Allergen Reactions     Adhesive Tape      Data   Most Recent 3 CBC's:Recent Labs   Lab Test 05/01/23 2322 04/13/23  0701 10/25/22  1337   WBC 11.1* 6.3 9.0   HGB 12.5 11.7 12.1   MCV 94 93 96    303 378      Most Recent 3 BMP's:  Recent Labs   Lab Test 05/01/23 2322 04/13/23  0701 10/25/22  1337    141 143   POTASSIUM 4.2 4.2 4.2   CHLORIDE 105 105 107   CO2 24 25 25   BUN 23.2* 16.9 22.7   CR 0.90 0.92 0.85   ANIONGAP 11 11 11   ELISEO 9.7 9.5 9.6   GLC 91 104* 104*     Most Recent 2 LFT's:  Recent Labs   Lab Test 05/01/23 2322 04/13/23  0701   AST 24 18   ALT 17 16   ALKPHOS 73 66   BILITOTAL 0.4 0.6         REPEAT HEAD ct 5/2/23  COMPARISON: Head CT 5/1/2023                                                                      IMPRESSION: No change. No change of small area of hyperattenuation  along the right parietal lobe, presumed acute subarachnoid hemorrhage.  No new areas of hemorrhage.

## 2023-05-02 NOTE — ED TRIAGE NOTES
Pt arrives via EMS presenting after fall at [facility] care facility. EMS reports pt went to bed at 8pm and staff found pt around 10pm on floor next to bed with abrasions to face. Staff reports baseline confusion.      Triage Assessment     Row Name 05/01/23 5055       Triage Assessment (Adult)    Airway WDL WDL       Respiratory WDL    Respiratory WDL WDL       Skin Circulation/Temperature WDL    Skin Circulation/Temperature WDL WDL       Cardiac WDL    Cardiac WDL WDL       Peripheral/Neurovascular WDL    Peripheral Neurovascular WDL WDL       Cognitive/Neuro/Behavioral WDL    Cognitive/Neuro/Behavioral WDL X  Baseline dementia       Pupils (CN II)    Pupil PERRLA yes    Pupil Size Left 3 mm    Pupil Size Right 3 mm       Garth Coma Scale    Best Eye Response 4-->(E4) spontaneous    Best Motor Response 6-->(M6) obeys commands    Best Verbal Response 4-->(V4) confused    Garth Coma Scale Score 14

## 2023-05-02 NOTE — ED PROVIDER NOTES
History     Chief Complaint:  Head Injury       HPI   Clarisse Nobles is a 82 year old female with a history of dementia who presents after an unwitnessed fall. She comes from a nursing home via EMS. Her nursing home reported that she was put to bed at 8:30pm and they do checks at 10pm. At 10pm she was found on the ground. They think she was trying to go to the bathroom with her walker which is very abnormal. She fell on carpet. She has facial injuries and she said something about her left leg to EMS. EMS reported that she just nods and smiles which is baseline for her. She does not look like she is in pain. Her blood pressure was 170/80. Her O2 sats were normal. Blood sugar was in the 80s.     Independent Historian: EMS    Review of External Notes: ED visit from 4/13/2023    ROS:  Review of Systems   Unable to perform ROS: Dementia       Allergies:  No Known Allergies     Medications:    No current outpatient medications on file.    Past Medical History:    Motor neuron disease  Osteoporosis  Dementia  Dehydration  Hypokalemia  Elevated lactic acid level  Osteoarthritis of knee  Psoriasis  Osteopenia  Fronto-temporal dementia  Memory loss  Hypertension  Enthesopathy of knee  Rosacea  Pure hypercholesterolemia    Past Surgical History:    Hysterectomy  Colonoscopy  Dermatofibroma removal  Excise left neck lipoma      Social History:  PCP: [facility]     Physical Exam     Patient Vitals for the past 24 hrs:   BP Temp Temp src Pulse Resp SpO2   05/02/23 0033 (!) 169/116 -- -- 87 -- --   05/01/23 2326 -- 97.1  F (36.2  C) Temporal -- 16 98 %   05/01/23 2323 (!) 198/98 -- -- 90 -- --        Physical Exam  General: Patient is alert and normal appearing.  Pleasantly demented HEENT: Head abrasions noted   Eyes: pupils equal and reactive. Conjunctiva clear   Nares: patent evidence of dried blood in the nares no septal hematoma   Oropharynx: no lesions, uvula midline, no palatal draping, normal voice, no  trismus  Neck: Supple without lymphadenopathy, no meningismus  Chest: Heart regular rate and rhythm.   Lungs: Equal clear to auscultation with no wheeze or rales  Abdomen: Soft, non tender, nondistended, normal bowel sounds  Back: No costovertebral angle tenderness, no midline C, T or L spine tenderness  Neuro: Grossly nonfocal, normal speech, strength equal bilaterally, CN 2-12 intact  Extremities: No deformities, equal radial and DP pulses. No clubbing, cyanosis.  No edema  Skin: Warm and dry with no rash.  Facial abrasions with bleeding controlled      Emergency Department Course   ECG  ECG taken at 0053, ECG read at 0105  Sinus rhythm with occasional premature ventricular complexes   Rate 77 bpm. VA interval 130 ms. QRS duration 68 ms. QT/QTc 392/443 ms. P-R-T axes 67 -12 51.     Imaging:  CT Head w/o Contrast   Final Result   IMPRESSION:   HEAD CT:   1.  Focal subarachnoid hemorrhage along the right parietal convexity.   2.  Generalized volume loss and sequelae of chronic microvascular ischemic disease, as described.      FACIAL BONE CT:   1.  No facial bone or mandibular fracture.      CERVICAL SPINE CT:   1.  No evidence of an acute displaced fracture.   2.  Degenerative changes, as described.   3.  Right thyroid nodule measuring 3 cm in diameter.      REFERENCE:   Donald ROCHAK et al. Managing Incidental Thyroid Nodules Detected on Imaging: White Paper of the ACR Incidental Thyroid Findings Committee. JACR 2015; 12:143-150.      Incidental thyroid nodule detected on CT or MRI without suspicious findings. Applies to general population without limited life expectancy or significant comorbidities.      Age greater than or equal to 35 years   Less than 1.5 cm: No further evaluation.   Greater than or equal to 1.5 cm: Evaluate with thyroid ultrasound.      CT head findings were discussed with Dr. Charlene Ruelas at 12:52 AM on 05/02/2023.      CT Facial Bones without Contrast   Final Result   IMPRESSION:   HEAD CT:   1.   Focal subarachnoid hemorrhage along the right parietal convexity.   2.  Generalized volume loss and sequelae of chronic microvascular ischemic disease, as described.      FACIAL BONE CT:   1.  No facial bone or mandibular fracture.      CERVICAL SPINE CT:   1.  No evidence of an acute displaced fracture.   2.  Degenerative changes, as described.   3.  Right thyroid nodule measuring 3 cm in diameter.      REFERENCE:   Bennett JK et al. Managing Incidental Thyroid Nodules Detected on Imaging: White Paper of the ACR Incidental Thyroid Findings Committee. JACR 2015; 12:143-150.      Incidental thyroid nodule detected on CT or MRI without suspicious findings. Applies to general population without limited life expectancy or significant comorbidities.      Age greater than or equal to 35 years   Less than 1.5 cm: No further evaluation.   Greater than or equal to 1.5 cm: Evaluate with thyroid ultrasound.      CT head findings were discussed with Dr. Charlene Ruelas at 12:52 AM on 05/02/2023.      CT Cervical Spine w/o Contrast   Final Result   IMPRESSION:   HEAD CT:   1.  Focal subarachnoid hemorrhage along the right parietal convexity.   2.  Generalized volume loss and sequelae of chronic microvascular ischemic disease, as described.      FACIAL BONE CT:   1.  No facial bone or mandibular fracture.      CERVICAL SPINE CT:   1.  No evidence of an acute displaced fracture.   2.  Degenerative changes, as described.   3.  Right thyroid nodule measuring 3 cm in diameter.      REFERENCE:   Bennett JK et al. Managing Incidental Thyroid Nodules Detected on Imaging: White Paper of the ACR Incidental Thyroid Findings Committee. JACR 2015; 12:143-150.      Incidental thyroid nodule detected on CT or MRI without suspicious findings. Applies to general population without limited life expectancy or significant comorbidities.      Age greater than or equal to 35 years   Less than 1.5 cm: No further evaluation.   Greater than or equal to 1.5 cm:  Evaluate with thyroid ultrasound.      CT head findings were discussed with Dr. Charlene Ruelas at 12:52 AM on 05/02/2023.      XR Pelvis 1/2 Views   Final Result   IMPRESSION: No visualized acute fracture, malalignment or other acute osseous abnormality of the pelvis.          Report per radiology    Laboratory:  Labs Ordered and Resulted from Time of ED Arrival to Time of ED Departure   COMPREHENSIVE METABOLIC PANEL - Abnormal       Result Value    Sodium 140      Potassium 4.2      Chloride 105      Carbon Dioxide (CO2) 24      Anion Gap 11      Urea Nitrogen 23.2 (*)     Creatinine 0.90      Calcium 9.7      Glucose 91      Alkaline Phosphatase 73      AST 24      ALT 17      Protein Total 7.1      Albumin 4.0      Bilirubin Total 0.4      GFR Estimate 63     CBC WITH PLATELETS AND DIFFERENTIAL - Abnormal    WBC Count 11.1 (*)     RBC Count 4.17      Hemoglobin 12.5      Hematocrit 39.0      MCV 94      MCH 30.0      MCHC 32.1      RDW 14.4      Platelet Count 331      % Neutrophils 68      % Lymphocytes 17      % Monocytes 10      % Eosinophils 4      % Basophils 1      % Immature Granulocytes 0      NRBCs per 100 WBC 0      Absolute Neutrophils 7.6      Absolute Lymphocytes 1.9      Absolute Monocytes 1.1      Absolute Eosinophils 0.4      Absolute Basophils 0.1      Absolute Immature Granulocytes 0.0      Absolute NRBCs 0.0     ROUTINE UA WITH MICROSCOPIC REFLEX TO CULTURE   INR      Emergency Department Course & Assessments:             Interventions:  Medications   labetalol (NORMODYNE/TRANDATE) injection 10 mg (has no administration in time range)   Td (tetanus & diphtheria toxoids) -  adult formulation - for ages 7 years and older (0.5 mLs Intramuscular $Given 5/2/23 0106)        Assessments:  2315 I obtained a history and examined the patient     Independent Interpretation (X-rays, CTs, rhythm strip):  None    Consultations/Discussion of Management or Tests:  0050 I spoke with radiology.  0126 I spoke with  José Miguel Mascorro, neuro surgery, regarding the patient's condition   0136 I spoke with Dr. العراقي, hospitalist, who agreed to admit the patient.     Social Determinants of Health affecting care:   None    Disposition:  The patient was admitted to the hospital under the care of Dr. العراقي.     Impression & Plan      Medical Decision Making:  Clarisse Nobles is a 82 year old female who presents for evaluation of a fall.  This was an unwitnessed fall in a patient who lives in memory care.  EKG without dysrhythmia or signs of acute ST changes.  Detailed exam shows abrasions to the face and dried blood in the naris.  Based on this I did do basic blood work and urinalysis.  Results as above.  Given patient's facial injuries head CT, facial CT and neck CT were obtained.  Small amount of focal subarachnoid hemorrhage is identified.  Radiology read no cervical spine trauma or facial bone trauma.  I discussed with Alexus Mascorro on-call for neurosurgery who recommended repeat head CT tomorrow.  Patient's not on anticoagulation.  Patient was noted to be hypertensive intermittent dose of labetalol was ordered.  Plan is for admission to the hospital service with neurosurgery consultation and repeat head CT scan in the morning.          Diagnosis:    ICD-10-CM    1. Injury of head, initial encounter  S09.90XA       2. Fall, initial encounter  W19.XXXA       3. Subarachnoid hemorrhage (H)  I60.9            Scribe Disclosure:  Farzaneh SMITH, am serving as a scribe at 11:22 PM on 5/1/2023 to document services personally performed by Charlene Ruelas MD based on my observations and the provider's statements to me.   5/1/2023   Charlene Ruelas MD Neuner, Maria Bea, MD  05/02/23 0232

## 2023-05-02 NOTE — PHARMACY-ADMISSION MEDICATION HISTORY
Pharmacist Admission Medication History    Admission medication history is complete. The information provided in this note is only as accurate as the sources available at the time of the update.    Medication reconciliation/reorder completed by provider prior to medication history? No    Information Source(s): Facility ([facility]/) medication list/MAR via N/A      Changes made to PTA medication list:    Added: all meds added    Deleted: None    Changed: None       Allergies reviewed with patient and updates made in EHR: yes    Medication History Completed By: Cheri Bernardo RPH 5/2/2023 12:37 PM    Prior to Admission medications    Medication Sig Last Dose Taking? Auth Provider Long Term End Date   acetaminophen (TYLENOL) 500 MG tablet Take 500-1,000 mg by mouth every 6 hours as needed for mild pain prn Yes Unknown, Entered By History     Ascorbic Acid (VITAMIN C) 500 MG CAPS Take 500 mg by mouth daily 5/1/2023 Yes Unknown, Entered By History     busPIRone (BUSPAR) 7.5 MG tablet Take 7.5 mg by mouth 2 times daily 5/1/2023 Yes Unknown, Entered By History Yes    calcipotriene (DOVONOX) 0.005 % external cream Apply topically 2 times daily 5/1/2023 Yes Unknown, Entered By History     leflunomide (ARAVA) 10 MG tablet Take 10 mg by mouth daily 5/1/2023 Yes Unknown, Entered By History     melatonin 1 MG TABS tablet Take 1 mg by mouth At Bedtime 5/1/2023 Yes Unknown, Entered By History     menthol-zinc oxide (CALMOSEPTINE) 0.44-20.6 % OINT ointment Apply topically 2 times daily 5/1/2023 Yes Unknown, Entered By History     multivitamin, therapeutic (THERA-VIT) TABS tablet Take 1 tablet by mouth daily 5/1/2023 Yes Unknown, Entered By History     Vitamin D3 (CHOLECALCIFEROL) 25 mcg (1000 units) tablet Take 1 tablet by mouth daily 5/1/2023 Yes Unknown, Entered By History

## 2023-05-02 NOTE — TELEPHONE ENCOUNTER
Staff message was sent to schedule 1 month hospital follow up with head CT prior. I called patient's daughter and son. The siblings will coordinate with each other regarding this follow up appointment. I was told by patient's son, Pako that patient will need assistance getting to the appointments through ambulance or other services. Pako also had questions regarding the follow up appointment and what will happen at the follow up and what steps will they need to take at the follow up appointment. He had questions about the appointment in general and needing help transporting his mother to the appointments. He is requesting a call back from clinic and would also like clinic to reach out to his sisters regarding this appointment as well.

## 2023-05-03 NOTE — PROGRESS NOTES
[facility]    Background: Transitional Care Management program identified per system criteria and reviewed by [facility] team for possible outreach.    Assessment: Upon chart review, [facility] Team member will not proceed with patient outreach related to this episode of Transitional Care Management program due to reason below:    Patient has discharged to a Memory Care, Long-term Care, Assisted Living or Group Home where patient is receiving on-site support with their daily cares, including support with hospital follow up plan.     Patient discharged to memory care.    Plan: Transitional Care Management episode addressed appropriately per reason noted above.      Carlyn Conley RN  [facility], [facility]    *Connected Care Resource Team does NOT follow patient ongoing. Referrals are identified based on internal discharge reports and the outreach is to ensure patient has an understanding of their discharge instructions.